# Patient Record
Sex: FEMALE | Race: WHITE | HISPANIC OR LATINO | Employment: UNEMPLOYED | URBAN - METROPOLITAN AREA
[De-identification: names, ages, dates, MRNs, and addresses within clinical notes are randomized per-mention and may not be internally consistent; named-entity substitution may affect disease eponyms.]

---

## 2022-04-22 ENCOUNTER — HISTORICAL (OUTPATIENT)
Dept: ADMINISTRATIVE | Facility: HOSPITAL | Age: 27
End: 2022-04-22
Payer: MEDICAID

## 2022-04-22 LAB
ABS NEUT (OLG): 6.75 (ref 2.1–9.2)
APPEARANCE, UA: NORMAL
BACTERIA SPEC CULT: NORMAL
BASOPHILS # BLD AUTO: 0 10*3/UL (ref 0–0.2)
BASOPHILS NFR BLD AUTO: 0 %
BILIRUB UR QL STRIP: NEGATIVE
BUN SERPL-MCNC: 8 MG/DL (ref 7–18.7)
CALCIUM SERPL-MCNC: 9.4 MG/DL (ref 8.7–10.5)
CHLORIDE SERPL-SCNC: 107 MMOL/L (ref 98–107)
CO2 SERPL-SCNC: 22 MMOL/L (ref 22–29)
COLOR UR: YELLOW
CREAT SERPL-MCNC: 0.81 MG/DL (ref 0.55–1.02)
CREAT/UREA NIT SERPL: 10
EOSINOPHIL # BLD AUTO: 0.2 10*3/UL (ref 0–0.9)
EOSINOPHIL NFR BLD AUTO: 2 %
ERYTHROCYTE [DISTWIDTH] IN BLOOD BY AUTOMATED COUNT: 13.2 % (ref 11.5–17)
GLUCOSE (UA): NEGATIVE
GLUCOSE SERPL-MCNC: 92 MG/DL (ref 74–100)
HCT VFR BLD AUTO: 38.7 % (ref 37–47)
HEMOLYSIS INTERF INDEX SERPL-ACNC: 33
HGB BLD-MCNC: 12.8 G/DL (ref 12–16)
HGB UR QL STRIP: NORMAL
ICTERIC INTERF INDEX SERPL-ACNC: 0
IMM GRANULOCYTES # BLD AUTO: 0.03 10*3/UL (ref 0–0.02)
IMM GRANULOCYTES NFR BLD AUTO: 0.3 % (ref 0–0.43)
KETONES UR QL STRIP: NEGATIVE
LEUKOCYTE ESTERASE UR QL STRIP: NEGATIVE
LIPEMIC INTERF INDEX SERPL-ACNC: 9
LYMPHOCYTES # BLD AUTO: 2.3 10*3/UL (ref 0.6–4.6)
LYMPHOCYTES NFR BLD AUTO: 23 %
MANUAL DIFF? (OHS): NO
MCH RBC QN AUTO: 31.8 PG (ref 27–31)
MCHC RBC AUTO-ENTMCNC: 33.1 G/DL (ref 33–36)
MCV RBC AUTO: 96 FL (ref 80–94)
MONOCYTES # BLD AUTO: 0.8 10*3/UL (ref 0.1–1.3)
MONOCYTES NFR BLD AUTO: 8 %
NEUTROPHILS # BLD AUTO: 6.75 10*3/UL (ref 1.4–7.9)
NEUTROPHILS NFR BLD AUTO: 67 %
NITRITE UR QL STRIP: NEGATIVE
PH UR STRIP: 7 [PH] (ref 5–9)
PLATELET # BLD AUTO: 236 10*3/UL (ref 130–400)
PMV BLD AUTO: 9.4 FL (ref 9.4–12.4)
POTASSIUM SERPL-SCNC: 4.1 MMOL/L (ref 3.5–5.1)
PROT UR QL STRIP: NEGATIVE
RBC # BLD AUTO: 4.03 10*6/UL (ref 4.2–5.4)
RBC #/AREA URNS HPF: NORMAL /[HPF] (ref 0–2)
SODIUM SERPL-SCNC: 142 MMOL/L (ref 136–145)
SP GR UR STRIP: 1.02 (ref 1–1.03)
SQUAMOUS EPITHELIAL, UA: NORMAL
UROBILINOGEN UR STRIP-ACNC: 0.2
WBC # SPEC AUTO: 10.1 10*3/UL (ref 4.5–11.5)
WBC #/AREA URNS HPF: NORMAL /[HPF] (ref 0–2)

## 2022-04-26 LAB
PAP RECOMMENDATION EXT: NORMAL
PAP SMEAR: NORMAL

## 2022-05-02 RX ORDER — PNV,CALCIUM 72/IRON/FOLIC ACID 27 MG-1 MG
1 TABLET ORAL DAILY
COMMUNITY
Start: 2022-05-02 | End: 2022-05-04 | Stop reason: SDUPTHER

## 2022-05-04 DIAGNOSIS — Z34.90 PREGNANCY, UNSPECIFIED GESTATIONAL AGE: Primary | ICD-10-CM

## 2022-05-05 ENCOUNTER — TELEPHONE (OUTPATIENT)
Dept: OBGYN | Facility: CLINIC | Age: 27
End: 2022-05-05
Payer: MEDICAID

## 2022-05-12 DIAGNOSIS — Z36.89 ENCOUNTER FOR FETAL ANATOMIC SURVEY: Primary | ICD-10-CM

## 2022-05-24 ENCOUNTER — OFFICE VISIT (OUTPATIENT)
Dept: FAMILY MEDICINE | Facility: CLINIC | Age: 27
End: 2022-05-24
Payer: MEDICAID

## 2022-05-24 ENCOUNTER — PROCEDURE VISIT (OUTPATIENT)
Dept: MATERNAL FETAL MEDICINE | Facility: CLINIC | Age: 27
End: 2022-05-24
Payer: MEDICAID

## 2022-05-24 VITALS
HEART RATE: 91 BPM | WEIGHT: 220.44 LBS | HEIGHT: 65 IN | DIASTOLIC BLOOD PRESSURE: 71 MMHG | TEMPERATURE: 98 F | BODY MASS INDEX: 36.73 KG/M2 | OXYGEN SATURATION: 99 % | SYSTOLIC BLOOD PRESSURE: 106 MMHG

## 2022-05-24 DIAGNOSIS — Z67.41 TYPE O BLOOD, RH NEGATIVE: ICD-10-CM

## 2022-05-24 DIAGNOSIS — Z3A.33 33 WEEKS GESTATION OF PREGNANCY: Primary | ICD-10-CM

## 2022-05-24 DIAGNOSIS — Z36.89 ENCOUNTER FOR FETAL ANATOMIC SURVEY: ICD-10-CM

## 2022-05-24 DIAGNOSIS — O99.810 ABNORMAL GLUCOSE TOLERANCE TEST (GTT) DURING PREGNANCY, ANTEPARTUM: ICD-10-CM

## 2022-05-24 LAB
BILIRUB SERPL-MCNC: NORMAL MG/DL
BLOOD URINE, POC: NORMAL
CLARITY, POC UA: CLEAR
COLOR, POC UA: YELLOW
GLUCOSE UR QL STRIP: NORMAL
KETONES UR QL STRIP: NORMAL
LEUKOCYTE ESTERASE URINE, POC: NORMAL
NITRITE, POC UA: NORMAL
PH, POC UA: 8.5
PROTEIN, POC: NORMAL
SPECIFIC GRAVITY, POC UA: 1.02
UROBILINOGEN, POC UA: NORMAL

## 2022-05-24 PROCEDURE — 76805 OB US >/= 14 WKS SNGL FETUS: CPT | Mod: S$GLB,,, | Performed by: OBSTETRICS & GYNECOLOGY

## 2022-05-24 PROCEDURE — 76805 US MFM PROCEDURE (VIEWPOINT): ICD-10-PCS | Mod: S$GLB,,, | Performed by: OBSTETRICS & GYNECOLOGY

## 2022-05-24 PROCEDURE — 99213 OFFICE O/P EST LOW 20 MIN: CPT | Mod: PBBFAC

## 2022-05-24 PROCEDURE — 81002 URINALYSIS NONAUTO W/O SCOPE: CPT | Mod: PBBFAC

## 2022-05-24 NOTE — PROGRESS NOTES
"  Subjective:       Patient ID: Stia Lovelace is a 27 y.o. female.    Chief Complaint:  Routine Prenatal Visit (32 Weeks 4 days OB visit, "cold, throat hurts, right ear pain x 1 week")      History of Present Illness  HPI   26 yo female  at 33.4 WGA by 29WGA ultrasound (EDC: 2022) presents for a routine prenatal visit.    Acute Complaints:  Complaining of cough, rhinorrhea, and ear pain, has felt feverish but has not taken temperature, associated R ear pain, has been feeling these symptoms for about one week, denies any sick contacts. Cough productive of clear-greenish sputum, pleuritic chest pain. Has not taken anything for her symptoms.    GYN & OB History  Gestational History:  G1: 2017, VMI via LTCS @ 38WGA Oligohydramnios  G2: current    Gyn History:   LMP: 10/9/2021  Age at menarche: 13yo  Menstrual hx:regular before 1st baby, irregular after birth control. monthly, 6 days, heaviest first day, no clots larger than a quarter  History of birth control: OCPs, Depo  History of STDs and/or Abnormal PAPs: Denies Hx STI. Ulcer on cervix? Dysplasia due to low defenses? s/p bx last pap 2021 in [place]      Review of Systems  OB Review of Systems:  Fetal movements: Yes  Vaginal bleeding: No  Vaginal discharge: No  Loss of fluid: Once about one week ago, has not happened since  Contractions: No  Headaches: Yes, resolved with Tylenol  Vision changes: Occasiobnal blurred vision in L eye, resolveds on own, not associated with headaches  Edema: just in feet, relived with elevation    Objective:   Vitals:  Blood pressure 106/71, pulse 91, temperature 98.4 °F (36.9 °C), height 5' 4.57" (1.64 m), weight 100 kg (220 lb 7.4 oz), SpO2 99 %.    Physical Exam    General: NAD  RESP: clear to auscultation bilaterally, non labored  CV: regular rate and rhythm, no murmurs, no edema  ABD: gravid, nontender, Bs+    FHTs: 146-150 bpm via doppler    Fundal height: 33.5cm    POCT URINE DIPSTICK WITHOUT MICROSCOPE  Order: " [id number]   Status: Final result     Visible to patient: No (inaccessible in Patient Portal)     Next appt: 06/07/2022 at 03:00 PM in Family Medicine (RESIDENT [address] [facility])     Dx: 33 weeks gestation of pregnancy     0 Result Notes    Component 13:38    Color, UA Yellow    pH, UA 8.5    WBC, UA neg    Nitrite, UA neg    Protein, POC neg    Glucose, UA neg    Ketones, UA neg    Urobilinogen, UA 0.2 E.U. / dL    Bilirubin, POC neg    Blood, UA Trace- Intact    Clarity, UA Clear    Spec Grav UA 1.020               Specimen Collected: 05/24/22 13:38 Last Resulted: 05/24/22 13:38               Initial OB Labs: 04/22/2022  - Blood Type and Rh: O Negative  - Antibody Screen: Negative  - CBC H/H: 12.7/37.5  - HIV: Nonreactive  - RPR: Nonreactive  - GC: Negative  - CT: Negative  - HBsAg: Nonreactive  - HCVAb: Nonreactive  - Rubella: Immune  - Varicella: Immune  - UA & Culture: Negative  - Sickle Cell Screen: Negative  - PAP: NIL    15-20 Weeks Lab  - Quad Screen: Presented for care out of range for screening    28 Week Lab: 04/22/2022  - 1H GTT: 147  - Rhogam: WILL NEED TO BE ADMINISTERED AT NEXT VISIT  - Date of Tdap: Not Done    36 Week Lab  - CBC H/H: _  - RPR: _  - GBS Culture: _  - HIV: _  - Urine: GC: _    Ultrasound  -Initial US: 04/22/2022  There is a single intrauterine gestation breech presentation. There is subjective normal amniotic fluid volume. Placenta anterior unremarkable. Positive fetal heart tones documented rate 159 BPM. SHELBY 18. Fetal measurements are concordant, estimated fetal weight 1311 g     IMPRESSION:  Single viable intrauterine gestation estimated age 29 weeks breech presentation    -20 wk anatomy US: 05/12/2022  Results Pending    Assessment:        1. 33 weeks gestation of pregnancy    2. Type O blood, Rh negative    3. Abnormal glucose tolerance test (GTT) during pregnancy, antepartum             Plan:      Type O blood, Rh negative  -Will need Rhogam at next visit.    33 weeks  gestation of pregnancy  -OB Protocol  -Counselled to take PNVs daily, stay well hydrated  -Tylenol only if needed for headaches  -Urine dip reviewed  -Initial labs and 1hr GTT reviewed, failed 1hr GTT, will schedule for fasting 3hr GTT next week  -Postpartum contraception: Patient desires BTL,  Forms signed 05/24/2022, scanned into chart  -Labor and ED precautions discussed in depth.      Abnormal glucose tolerance test (GTT) during pregnancy, antepartum  -3hr Fasting GTT previously ordered, unable to contact patient for results of 1hr GTT and to schedule 3hr fasting GTT  -Patient not fasting today, will like to come next week for testing.  -Nurse visit in 1 wk for 3hr GTT      RTC in 2 wks for routine prenatal follow up

## 2022-05-25 NOTE — ASSESSMENT & PLAN NOTE
-OB Protocol  -Counselled to take PNVs daily, stay well hydrated  -Tylenol only if needed for headaches  -Urine dip reviewed  -Initial labs and 1hr GTT reviewed, failed 1hr GTT, will schedule for fasting 3hr GTT next week  -Postpartum contraception: Patient desires BTL,  Forms signed 05/24/2022, scanned into chart  -Labor and ED precautions discussed in depth.

## 2022-05-25 NOTE — ASSESSMENT & PLAN NOTE
-3hr Fasting GTT previously ordered, unable to contact patient for results of 1hr GTT and to schedule 3hr fasting GTT  -Patient not fasting today, will like to come next week for testing.  -Nurse visit in 1 wk for 3hr GTT

## 2022-05-29 DIAGNOSIS — Z3A.34 34 WEEKS GESTATION OF PREGNANCY: Primary | ICD-10-CM

## 2022-05-29 DIAGNOSIS — O26.893 RH NEGATIVE STATE IN ANTEPARTUM PERIOD, THIRD TRIMESTER: ICD-10-CM

## 2022-05-29 DIAGNOSIS — Z67.91 RH NEGATIVE STATE IN ANTEPARTUM PERIOD, THIRD TRIMESTER: ICD-10-CM

## 2022-05-29 NOTE — PROGRESS NOTES
I have reviewed the notes, assessments, and/or procedures performed this visit, and I concur with the documentation.    Will clarify with resident regarding ENT exam, need for antibiotics, due for Rhogam, order for GTT reviewed and valid for clinic collect  Need monitor for presentation as nears term  BTL forms reviewed, appear correct, located under media    Nicolle LARON Schultz MD  [facility], Family Medicine Residency program

## 2022-05-29 NOTE — PROGRESS NOTES
Patient had visit on 05/24/2022, at that visit she complained of URI like symptoms x1 week. On PE she did not have an frontal or maxillary sinus tenderness, TMs were visible, with no bulging, no fluid behind TM, external ear canals were non inflamed bilaterally.    Patient had failed 1hr GTT and needed to have 3hr GTT and Rhogam injection at time of visit, however she said she did not want to stay to do either and would return in 1wk for a nurse visit to have both 3hr GTT and Rhogam injection done.  Explained to patient importance of returning for both procedures (with help of ) patient voiced understanding.

## 2022-06-07 DIAGNOSIS — Z36.89 ENCOUNTER FOR FETAL ANATOMIC SURVEY: Primary | ICD-10-CM

## 2022-06-09 ENCOUNTER — OFFICE VISIT (OUTPATIENT)
Dept: FAMILY MEDICINE | Facility: CLINIC | Age: 27
End: 2022-06-09
Payer: MEDICAID

## 2022-06-09 VITALS
HEART RATE: 83 BPM | OXYGEN SATURATION: 100 % | BODY MASS INDEX: 38.47 KG/M2 | WEIGHT: 225.31 LBS | DIASTOLIC BLOOD PRESSURE: 69 MMHG | SYSTOLIC BLOOD PRESSURE: 110 MMHG | HEIGHT: 64 IN | TEMPERATURE: 98 F

## 2022-06-09 DIAGNOSIS — O99.810 ABNORMAL GLUCOSE TOLERANCE TEST (GTT) DURING PREGNANCY, ANTEPARTUM: ICD-10-CM

## 2022-06-09 DIAGNOSIS — Z3A.35 35 WEEKS GESTATION OF PREGNANCY: Primary | ICD-10-CM

## 2022-06-09 DIAGNOSIS — Z67.41 TYPE O BLOOD, RH NEGATIVE: ICD-10-CM

## 2022-06-09 LAB
BILIRUB SERPL-MCNC: NEGATIVE MG/DL
BLOOD URINE, POC: NORMAL
CLARITY, POC UA: NORMAL
COLOR, POC UA: YELLOW
GLUCOSE UR QL STRIP: NEGATIVE
KETONES UR QL STRIP: NEGATIVE
LEUKOCYTE ESTERASE URINE, POC: NEGATIVE
NITRITE, POC UA: NEGATIVE
PH, POC UA: 7
PROTEIN, POC: NEGATIVE
SPECIFIC GRAVITY, POC UA: 1.01
UROBILINOGEN, POC UA: 0.2

## 2022-06-09 PROCEDURE — 99213 OFFICE O/P EST LOW 20 MIN: CPT | Mod: PBBFAC

## 2022-06-09 PROCEDURE — 81002 URINALYSIS NONAUTO W/O SCOPE: CPT | Mod: PBBFAC

## 2022-06-09 NOTE — PROGRESS NOTES
[facility] FM Office Visit Note  OB Antepartum    Chief Complaint:   Routine Prenatal Visit (35^6)     HPI:  Sita Lovelace is a 27 y.o. female   35w6d 2022, by Ultrasound [facility].    Current Issues:   None. Compliant with PNVs. Doing well.     Chronic Issues:   1. Rh negative status - missed Rhogam inj at last visit.   2. Abnormal GTT - failed 1 hour GTT, was unable to perform 3 hour at last visit.      Gestational History:  (date, GA, length labor, BW, sex, type, anes, place, complications)  G1: 2017, VMI via LTCS @ 38WGA Oligohydramnios  G2: current    Gyn History:   LMP: 10/9/2021  Age at menarche: 13yo  Menstrual hx:regular before 1st baby, irregular after birth control. monthly, 6 days, heaviest first day, no clots larger than a quarter  History of birth control: OCPs, Depo  History of STDs and/or Abnormal PAPs: Denies Hx STI. Ulcer on cervix? Dysplasia due to low defenses? s/p bx last pap 2021 in [place]    Review of Systems:   Fetal movement: yes   Vaginal Bleeding: no   Vaginal discharge: no   Fluid Leakage: no   Headaches: no   Vision Changes: no   Edema: no   Contractions: no    Constitutional: -fever, -chills  Respiratory: -cough, -shortness of breath  Cardiovascular: -chest pain, -palpitations  Gastrointestinal: -nausea, -vomiting, -constipation, -diarrhea  Integumentary: -rash, -lesion    Physical Exam:   Vitals:    22 1519   BP: 110/69   Pulse: 83   Temp: 98.4 °F (36.9 °C)        General: NAD, resting comfortably  Heart: RRR  Lungs: CTAB, nonlabored respirations  Abdomen: Gravid, nontender    Fundal Height: 36 cm    FHTs: 145 bpm  Extremities: Nonedematous, nontender      Current Medications:   Current Outpatient Medications   Medication Sig Dispense Refill    PNV,calcium 72-iron-folic acid (WESTAB PLUS) 27 mg iron- 1 mg Tab Take 1 tablet (1 each total) by mouth once daily. 30 tablet 2     No current facility-administered medications for this visit.        Labs:  Urine dipstick:    06/09/22 16:47   Clarity, UA Slightly Cloudy   Color, UA Yellow   pH, UA 7.0   Spec Grav UA 1.015   Blood, UA moderate   WBC, UA negative   Nitrite, UA negative   Glucose, UA negative   Bilirubin, POC negative   Protein, POC negative   Ketones, UA negative   Urobilinogen, UA 0.2       Initial OB Labs:   - Blood Type and Rh: O Negative  - Antibody Screen: Negative  - CBC H/H: 12.7/37.5  - HIV: Nonreactive  - RPR: Nonreactive  - GC: Negative  - CT: Negative  - HBsAg: Nonreactive  - HCVAb: Nonreactive  - Rubella: Immune  - Varicella: Immune  - UA & Culture: Negative  - Sickle Cell Screen: Negative  - PAP: NIL    15-20 Weeks Lab  - Quad Screen: Presented for care out of range for screening    28 Week Lab: 04/22/2022  - 1H GTT: 147  - 3 H GTT: *planned for morning of 6/10  - Rhogam: *planned for morning of 6/10  - Date of Tdap: Not Done    37 Week Lab  - CBC H/H: _  - RPR: _  - GBS Culture: _  - HIV: _  - Urine: GC: _    Imaging:   Initial US 4/22/22:  single intrauterine gestation breech presentation. There is subjective normal amniotic fluid volume. Placenta anterior unremarkable. Positive fetal heart tones documented rate 159 BPM. SHELBY 18. Fetal measurements are concordant, estimated fetal weight 1311 g  Anatomy Scan 5/24/22: No fetal structural malformations are identified within the limitations of a third trimester exam (see limitations as noted above in   the fetal anatomy section of this report). AFV is normal. There is no evidence of placenta previa    Assessment:   1. 35 weeks gestation of pregnancy    2. Abnormal glucose tolerance test (GTT) during pregnancy, antepartum    3. Type O blood, Rh negative         Plan:  - Planned repeat CS. Need to schedule for 39 WGA at future visit.  - PNVs  - Urine dip reviewed as above  - Routine (initial) labs: reviewed.   - Mother plans to breast and bottlefeed  - Postpartum contraception Patient desires BTL,  Forms signed 05/24/2022, scanned  into chart  - Labor precautions discussed in depth  - Unable to administer Rhogam today in clinic due to lack of nursing staff eligible to administer shot. Unable to perform 3H GTT due to patient time constraints. Will perform both tomorrow morning. Patient plans to return to clinic around 10 AM.     Return to clinic in 1 week for routine visit  Return tomorrow for Rhogam and 3 H GTT    Darshan Hicks M.D. [facility] Medicine

## 2022-06-10 ENCOUNTER — CLINICAL SUPPORT (OUTPATIENT)
Dept: FAMILY MEDICINE | Facility: CLINIC | Age: 27
End: 2022-06-10
Payer: MEDICAID

## 2022-06-10 DIAGNOSIS — O99.810 ABNORMAL GLUCOSE TOLERANCE TEST (GTT) DURING PREGNANCY, ANTEPARTUM: Primary | ICD-10-CM

## 2022-06-10 DIAGNOSIS — Z67.41 TYPE O BLOOD, RH NEGATIVE: ICD-10-CM

## 2022-06-10 DIAGNOSIS — Z67.91 RH NEGATIVE STATE IN ANTEPARTUM PERIOD: ICD-10-CM

## 2022-06-10 DIAGNOSIS — O26.899 RH NEGATIVE STATE IN ANTEPARTUM PERIOD: Primary | ICD-10-CM

## 2022-06-10 DIAGNOSIS — O26.899 RH NEGATIVE STATE IN ANTEPARTUM PERIOD: ICD-10-CM

## 2022-06-10 DIAGNOSIS — Z67.91 RH NEGATIVE STATE IN ANTEPARTUM PERIOD: Primary | ICD-10-CM

## 2022-06-10 DIAGNOSIS — Z29.13 NEED FOR RHOGAM DUE TO RH NEGATIVE MOTHER: ICD-10-CM

## 2022-06-10 DIAGNOSIS — O99.810 ABNORMAL GLUCOSE TOLERANCE TEST (GTT) DURING PREGNANCY, ANTEPARTUM: ICD-10-CM

## 2022-06-10 DIAGNOSIS — Z29.13 NEED FOR RHOGAM DUE TO RH NEGATIVE MOTHER: Primary | ICD-10-CM

## 2022-06-10 LAB
GLUCOSE 1H P 100 G GLC PO SERPL-MCNC: 183 MG/DL (ref 74–100)
GLUCOSE 2H P 100 G GLC PO SERPL-MCNC: 154 MG/DL (ref 74–100)
GLUCOSE 3H P 100 G GLC PO SERPL-MCNC: 119 MG/DL (ref 74–100)
GLUCOSE P FAST SERPL-MCNC: 91 MG/DL (ref 74–100)
GROUP & RH: NORMAL
INDIRECT COOMBS GEL: NORMAL
RH IMMUNE GLOBULIN: NORMAL

## 2022-06-10 PROCEDURE — 82951 GLUCOSE TOLERANCE TEST (GTT): CPT

## 2022-06-10 PROCEDURE — 82947 ASSAY GLUCOSE BLOOD QUANT: CPT

## 2022-06-10 PROCEDURE — 82950 GLUCOSE TEST: CPT

## 2022-06-10 PROCEDURE — 99211 OFF/OP EST MAY X REQ PHY/QHP: CPT | Mod: PBBFAC

## 2022-06-10 PROCEDURE — 36415 COLL VENOUS BLD VENIPUNCTURE: CPT

## 2022-06-10 PROCEDURE — 86850 RBC ANTIBODY SCREEN: CPT

## 2022-06-10 NOTE — PROGRESS NOTES
I reviewed History, PE, A/P and chart was reviewed.  Services provided in a teaching facility, I was immediately available  I agree with resident, care reasonable and appropriate.   Management discussed with resident at time of visit.  Shows pt has arrived today for shot and labs

## 2022-06-14 ENCOUNTER — TELEPHONE (OUTPATIENT)
Dept: FAMILY MEDICINE | Facility: CLINIC | Age: 27
End: 2022-06-14
Payer: MEDICAID

## 2022-06-14 ENCOUNTER — PROCEDURE VISIT (OUTPATIENT)
Dept: MATERNAL FETAL MEDICINE | Facility: CLINIC | Age: 27
End: 2022-06-14
Payer: MEDICAID

## 2022-06-14 DIAGNOSIS — Z36.89 ENCOUNTER FOR FETAL ANATOMIC SURVEY: ICD-10-CM

## 2022-06-14 PROCEDURE — 76816 US MFM PROCEDURE (VIEWPOINT): ICD-10-PCS | Mod: S$GLB,,, | Performed by: OBSTETRICS & GYNECOLOGY

## 2022-06-14 PROCEDURE — 76816 OB US FOLLOW-UP PER FETUS: CPT | Mod: S$GLB,,, | Performed by: OBSTETRICS & GYNECOLOGY

## 2022-06-14 NOTE — TELEPHONE ENCOUNTER
It looks like Dr. Garcia saw this lady.  I was asked to enter the labs by Mr. Beebe because they were not in the computer.

## 2022-06-14 NOTE — TELEPHONE ENCOUNTER
Please review it appears as though rhogam was not given, please ask nurse or Dr JOSESITO Hess - looks like he tried to put the order in, she is now 36 weeks   mailed

## 2022-06-16 ENCOUNTER — OFFICE VISIT (OUTPATIENT)
Dept: FAMILY MEDICINE | Facility: CLINIC | Age: 27
End: 2022-06-16
Payer: MEDICAID

## 2022-06-16 VITALS
WEIGHT: 226.63 LBS | SYSTOLIC BLOOD PRESSURE: 116 MMHG | OXYGEN SATURATION: 99 % | HEIGHT: 64 IN | BODY MASS INDEX: 38.69 KG/M2 | DIASTOLIC BLOOD PRESSURE: 69 MMHG | HEART RATE: 74 BPM | TEMPERATURE: 99 F

## 2022-06-16 DIAGNOSIS — Z98.891 H/O CESAREAN SECTION: ICD-10-CM

## 2022-06-16 DIAGNOSIS — Z34.90 PREGNANCY, UNSPECIFIED GESTATIONAL AGE: Primary | ICD-10-CM

## 2022-06-16 DIAGNOSIS — Z67.91 RH NEGATIVE, ANTEPARTUM: Primary | ICD-10-CM

## 2022-06-16 DIAGNOSIS — O24.410 DIET CONTROLLED GESTATIONAL DIABETES MELLITUS (GDM) IN THIRD TRIMESTER: ICD-10-CM

## 2022-06-16 DIAGNOSIS — O26.899 RH NEGATIVE, ANTEPARTUM: Primary | ICD-10-CM

## 2022-06-16 DIAGNOSIS — O26.899 RH NEGATIVE, ANTEPARTUM: ICD-10-CM

## 2022-06-16 DIAGNOSIS — Z67.91 RH NEGATIVE, ANTEPARTUM: ICD-10-CM

## 2022-06-16 LAB
BASOPHILS # BLD AUTO: 0.03 X10(3)/MCL (ref 0–0.2)
BASOPHILS NFR BLD AUTO: 0.4 %
BILIRUB SERPL-MCNC: NEGATIVE MG/DL
BILIRUB SERPL-MCNC: NORMAL MG/DL
BLOOD URINE, POC: NORMAL
BLOOD URINE, POC: NORMAL
C TRACH DNA SPEC QL NAA+PROBE: NOT DETECTED
COLOR, POC UA: YELLOW
COLOR, POC UA: YELLOW
EOSINOPHIL # BLD AUTO: 0.18 X10(3)/MCL (ref 0–0.9)
EOSINOPHIL NFR BLD AUTO: 2.1 %
ERYTHROCYTE [DISTWIDTH] IN BLOOD BY AUTOMATED COUNT: 13.1 % (ref 11.5–17)
GLUCOSE UR QL STRIP: NEGATIVE
GLUCOSE UR QL STRIP: NORMAL
GROUP & RH: NORMAL
HCT VFR BLD AUTO: 38.8 % (ref 37–47)
HGB BLD-MCNC: 13.1 GM/DL (ref 12–16)
IMM GRANULOCYTES # BLD AUTO: 0.03 X10(3)/MCL (ref 0–0.02)
IMM GRANULOCYTES NFR BLD AUTO: 0.4 % (ref 0–0.43)
INDIRECT COOMBS GEL: NORMAL
KETONES UR QL STRIP: NEGATIVE
KETONES UR QL STRIP: NORMAL
LEUKOCYTE ESTERASE URINE, POC: NEGATIVE
LEUKOCYTE ESTERASE URINE, POC: NORMAL
LYMPHOCYTES # BLD AUTO: 1.99 X10(3)/MCL (ref 0.6–4.6)
LYMPHOCYTES NFR BLD AUTO: 23.5 %
MCH RBC QN AUTO: 32.1 PG (ref 27–31)
MCHC RBC AUTO-ENTMCNC: 33.8 MG/DL (ref 33–36)
MCV RBC AUTO: 95.1 FL (ref 80–94)
MONOCYTES # BLD AUTO: 0.69 X10(3)/MCL (ref 0.1–1.3)
MONOCYTES NFR BLD AUTO: 8.1 %
N GONORRHOEA DNA SPEC QL NAA+PROBE: NOT DETECTED
NEUTROPHILS # BLD AUTO: 5.6 X10(3)/MCL (ref 2.1–9.2)
NEUTROPHILS NFR BLD AUTO: 65.5 %
NITRITE, POC UA: NEGATIVE
NITRITE, POC UA: NORMAL
NRBC BLD AUTO-RTO: 0 %
PH, POC UA: 6.5
PH, POC UA: 7
PLATELET # BLD AUTO: 195 X10(3)/MCL (ref 130–400)
PMV BLD AUTO: 10.1 FL (ref 9.4–12.4)
PROTEIN, POC: NEGATIVE
PROTEIN, POC: NORMAL
RBC # BLD AUTO: 4.08 X10(6)/MCL (ref 4.2–5.4)
RH IMMUNE GLOBULIN: NORMAL
SPECIFIC GRAVITY, POC UA: 1.02
SPECIFIC GRAVITY, POC UA: 1.03
T PALLIDUM AB SER QL: NONREACTIVE
T PALLIDUM AB SER QL: NORMAL
UROBILINOGEN, POC UA: 0.2
UROBILINOGEN, POC UA: 0.2
WBC # SPEC AUTO: 8.5 X10(3)/MCL (ref 4.5–11.5)

## 2022-06-16 PROCEDURE — 87081 CULTURE SCREEN ONLY: CPT

## 2022-06-16 PROCEDURE — 86780 TREPONEMA PALLIDUM: CPT

## 2022-06-16 PROCEDURE — 86901 BLOOD TYPING SEROLOGIC RH(D): CPT | Performed by: STUDENT IN AN ORGANIZED HEALTH CARE EDUCATION/TRAINING PROGRAM

## 2022-06-16 PROCEDURE — 36415 COLL VENOUS BLD VENIPUNCTURE: CPT

## 2022-06-16 PROCEDURE — 87389 HIV-1 AG W/HIV-1&-2 AB AG IA: CPT

## 2022-06-16 PROCEDURE — 81001 URINALYSIS AUTO W/SCOPE: CPT | Mod: PBBFAC

## 2022-06-16 PROCEDURE — 87491 CHLMYD TRACH DNA AMP PROBE: CPT

## 2022-06-16 PROCEDURE — 85025 COMPLETE CBC W/AUTO DIFF WBC: CPT

## 2022-06-16 PROCEDURE — 85461 HEMOGLOBIN FETAL: CPT | Performed by: STUDENT IN AN ORGANIZED HEALTH CARE EDUCATION/TRAINING PROGRAM

## 2022-06-16 PROCEDURE — 87591 N.GONORRHOEAE DNA AMP PROB: CPT

## 2022-06-16 PROCEDURE — 99213 OFFICE O/P EST LOW 20 MIN: CPT | Mod: PBBFAC

## 2022-06-16 NOTE — LETTER
Diagnosis: Gestational Diabetes  O24,419    Diabetic Testing Strips: 4 boxes, 100 strips; 5 refills  Lancets: 100 lancets; 3 refills  Lancing Device: 1 unit  Glucometer: 1 unit      Agusto Sena DO

## 2022-06-17 PROBLEM — O41.00X0 OLIGOHYDRAMNIOS: Status: ACTIVE | Noted: 2022-04-26

## 2022-06-17 PROBLEM — O24.410 DIET CONTROLLED GESTATIONAL DIABETES MELLITUS (GDM) IN THIRD TRIMESTER: Status: ACTIVE | Noted: 2022-06-17

## 2022-06-17 PROBLEM — Z98.891 H/O CESAREAN SECTION: Status: ACTIVE | Noted: 2022-06-17

## 2022-06-17 PROBLEM — Z3A.33 33 WEEKS GESTATION OF PREGNANCY: Status: RESOLVED | Noted: 2022-05-24 | Resolved: 2022-06-17

## 2022-06-17 PROBLEM — O99.810 ABNORMAL GLUCOSE TOLERANCE TEST (GTT) DURING PREGNANCY, ANTEPARTUM: Status: RESOLVED | Noted: 2022-05-24 | Resolved: 2022-06-17

## 2022-06-17 LAB — HIV 1+2 AB+HIV1 P24 AG SERPL QL IA: NONREACTIVE

## 2022-06-17 RX ORDER — MULTIVITAMIN
1 TABLET ORAL DAILY
COMMUNITY
End: 2022-06-17

## 2022-06-17 NOTE — PROGRESS NOTES
Subjective:       Patient ID: Sita Lovelace is a 27 y.o. female.    Chief Complaint: Routine Prenatal Visit (36 w 6 d OB)      HPI:    Sita Lovelace is a 27 y.o. female   36w6d 2022, by Ultrasound presenting to [facility] for routine visit.    Current Issues:   None. Compliant with PNVs. Doing well.     Chronic Issues:   1. Rh negative status - missed Rhogam inj at last visit.   2. +Gestational Diabetes with + 3 hour GTT     Gestational History:  (date, GA, length labor, BW, sex, type, anes, place, complications)  G1: 2017, VMI via LTCS @ 38WGA Oligohydramnios  G2: current    Gyn History:   LMP: 10/9/2021  Age at menarche: 11yo  Menstrual hx:regular before 1st baby, irregular after birth control. monthly, 6 days, heaviest first day, no clots larger than a quarter  History of birth control: OCPs, Depo  History of STDs and/or Abnormal PAPs: Denies Hx STI. Ulcer on cervix? Dysplasia due to low defenses? s/p bx last pap 2021 in [place]     Review of Systems:   Fetal movement: yes   Vaginal Bleeding: no   Vaginal discharge: no   Fluid Leakage: no   Headaches: no   Vision Changes: no   Edema:  swelling in feet for past month   Contractions: no  : no dysuria      PE:  Vitals:    22 1427   BP: 116/69   Pulse: 74   Temp: 98.8 °F (37.1 °C)     General: NAD, resting comfortably  Heart: RRR  Lungs: CTAB, nonlabored respirations  Abdomen: Gravid, nontender    Fundal Height: 37 cm    FHTs: 140s bpm  Extremities: 1+ swelling in b/l feet, no pain to palpation of calves b/l, no erythema noted   : no labial lesions, mild white discharge in vaginal vault, cervix closed with no lesions on os.     22 15:22   Color, UA Yellow   pH, UA 6.5   Spec Grav UA 1.030   Blood, UA SMALL   WBC, UA NEGATIVE   Nitrite, UA NEGATIVE   Glucose, UA NEGATIVE   Bilirubin, POC NEGATIVE   Protein, POC NEGATIVE   Ketones, UA NEGATIVE   Urobilinogen, UA 0.2       Initial OB Labs:   - Blood Type and Rh: O Negative  - Antibody  Screen: Negative  - CBC H/H: 12.7/37.5  - HIV: Nonreactive  - RPR: Nonreactive  - GC: Negative  - CT: Negative  - HBsAg: Nonreactive  - HCVAb: Nonreactive  - Rubella: Immune  - Varicella: Immune  - UA & Culture: Negative  - Sickle Cell Screen: Negative  - PAP: NIL    15-20 Weeks Lab  - Quad Screen: Presented for care out of range for screening    28 Week Lab: 04/22/2022  - 1H GTT: 147  -3 hour GTT: + for Gestational Diabetes  - Date of Tdap: Not Done    37 Week Lab  -Rhogam: received today 6/16/22  - CBC H/H: _  - RPR: _  - GBS Culture: _  - HIV: _  - Urine: GC: _      Imaging:   Initial US 4/22/22:  single intrauterine gestation breech presentation. There is subjective normal amniotic fluid volume. Placenta anterior unremarkable. Positive fetal heart tones documented rate 159 BPM. SHELBY 18. Fetal measurements are concordant, estimated fetal weight 1311 g  Anatomy Scan 5/24/22: No fetal structural malformations are identified within the limitations of a third trimester exam (see limitations as noted above in   the fetal anatomy section of this report). AFV is normal. There is no evidence of placenta previa    Assessment and Plan:    36 WGA  - Planned repeat CS. Will call and schedule tomorrow  - PNVs  - Urine dip reviewed as above  - Routine (initial) labs: reviewed.   -37 week labs done including GBS  - Mother plans to breast and bottlefeed  - Postpartum contraception Patient desires BTL,  Forms signed 05/24/2022, scanned into chart  - Labor precautions discussed in depth    Type O Blood, RH (-)  -Rhogam given today 6/16/22      Gestational Diabetes  -+ 3 hour GTT/failed3 hour GTT  -sugar log given, handouts given in [place]  -glucometer, strips, lancets faxed to pharmacy  -refer back to high risk OB; will discuss with nurse  Marianela    Breech Presentation  -by US 4/22/22  -plan for C -section on 6/28/22 at 9 AM; we personally need to tell them to arrive at 6: 45 AM on day of 6/28/22.      -follow up in 1 week

## 2022-06-18 LAB — BACTERIA SPEC CULT: NORMAL

## 2022-06-18 NOTE — PROGRESS NOTES
I reviewed History, PE, A/P and chart was reviewed.  Services provided in a teaching facility, I was immediately available  I agree with resident, care reasonable and appropriate.   Management discussed with resident at time of visit.    Diagnosis breech 1 of christina dutta is incorrect    Rhogam not given at prev visit as there was no RN here to admin  Was given this visit, BTL papers on chart , c /s scheduled, was breech presentation 4/2022  Pt has [facility] FU, will need to ensure she was able to get testing supplies and instruction on use  Tdap has not been given that I can see although LINKS incomplete, it does show  - review at FU  One from 2017

## 2022-06-23 ENCOUNTER — OFFICE VISIT (OUTPATIENT)
Dept: FAMILY MEDICINE | Facility: CLINIC | Age: 27
End: 2022-06-23
Payer: MEDICAID

## 2022-06-23 VITALS
HEIGHT: 64 IN | WEIGHT: 226.63 LBS | RESPIRATION RATE: 20 BRPM | SYSTOLIC BLOOD PRESSURE: 108 MMHG | HEART RATE: 68 BPM | BODY MASS INDEX: 38.69 KG/M2 | DIASTOLIC BLOOD PRESSURE: 73 MMHG | OXYGEN SATURATION: 100 % | TEMPERATURE: 98 F

## 2022-06-23 DIAGNOSIS — Z30.2 REQUEST FOR STERILIZATION: ICD-10-CM

## 2022-06-23 DIAGNOSIS — Z67.91 RH NEGATIVE, ANTEPARTUM: ICD-10-CM

## 2022-06-23 DIAGNOSIS — O26.899 RH NEGATIVE, ANTEPARTUM: ICD-10-CM

## 2022-06-23 DIAGNOSIS — O24.419 GESTATIONAL DIABETES MELLITUS (GDM) IN THIRD TRIMESTER, GESTATIONAL DIABETES METHOD OF CONTROL UNSPECIFIED: ICD-10-CM

## 2022-06-23 DIAGNOSIS — Z3A.37 37 WEEKS GESTATION OF PREGNANCY: Primary | ICD-10-CM

## 2022-06-23 DIAGNOSIS — Z98.891 H/O CESAREAN SECTION: ICD-10-CM

## 2022-06-23 LAB
APPEARANCE UR: ABNORMAL
BACTERIA #/AREA URNS AUTO: ABNORMAL /HPF
BILIRUB SERPL-MCNC: NEGATIVE MG/DL
BILIRUB UR QL STRIP.AUTO: NEGATIVE MG/DL
BLOOD URINE, POC: NORMAL
CLARITY, POC UA: NORMAL
COLOR UR AUTO: YELLOW
COLOR, POC UA: YELLOW
GLUCOSE UR QL STRIP.AUTO: NORMAL MG/DL
GLUCOSE UR QL STRIP: NEGATIVE
HYALINE CASTS #/AREA URNS LPF: ABNORMAL /LPF
KETONES UR QL STRIP.AUTO: NEGATIVE MG/DL
KETONES UR QL STRIP: NEGATIVE
LEUKOCYTE ESTERASE UR QL STRIP.AUTO: NEGATIVE UNIT/L
LEUKOCYTE ESTERASE URINE, POC: NEGATIVE
MUCOUS THREADS URNS QL MICRO: ABNORMAL /LPF
NITRITE UR QL STRIP.AUTO: NEGATIVE
NITRITE, POC UA: NEGATIVE
PH UR STRIP.AUTO: 6 [PH]
PH, POC UA: 6
PROT UR QL STRIP.AUTO: ABNORMAL MG/DL
PROTEIN, POC: NEGATIVE
RBC #/AREA URNS AUTO: ABNORMAL /HPF
RBC UR QL AUTO: ABNORMAL UNIT/L
SP GR UR STRIP.AUTO: 1.02
SPECIFIC GRAVITY, POC UA: 1.02
SQUAMOUS #/AREA URNS LPF: ABNORMAL /HPF
UROBILINOGEN UR STRIP-ACNC: NORMAL MG/DL
UROBILINOGEN, POC UA: NORMAL
WBC #/AREA URNS AUTO: ABNORMAL /HPF

## 2022-06-23 PROCEDURE — 99214 OFFICE O/P EST MOD 30 MIN: CPT | Mod: PBBFAC

## 2022-06-23 PROCEDURE — 81001 URINALYSIS AUTO W/SCOPE: CPT

## 2022-06-23 PROCEDURE — 90715 TDAP VACCINE 7 YRS/> IM: CPT | Mod: PBBFAC

## 2022-06-23 PROCEDURE — 81002 URINALYSIS NONAUTO W/O SCOPE: CPT | Mod: PBBFAC

## 2022-06-23 PROCEDURE — 90471 IMMUNIZATION ADMIN: CPT | Mod: PBBFAC

## 2022-06-23 RX ORDER — METFORMIN HYDROCHLORIDE 500 MG/1
500 TABLET, EXTENDED RELEASE ORAL NIGHTLY
Qty: 90 TABLET | Refills: 1 | Status: ON HOLD | OUTPATIENT
Start: 2022-06-23 | End: 2022-07-08 | Stop reason: HOSPADM

## 2022-06-23 RX ADMIN — TETANUS TOXOID, REDUCED DIPHTHERIA TOXOID AND ACELLULAR PERTUSSIS VACCINE, ADSORBED 0.5 ML: 5; 2.5; 8; 8; 2.5 SUSPENSION INTRAMUSCULAR at 09:06

## 2022-06-23 NOTE — PROGRESS NOTES
"[facility] OB OFFICE VISIT NOTE  Sita Lovelace  [id number]  2022      Chief Complaint: Routine Prenatal Visit (37wk, 6d)      Sita Lovelace is a 27 y.o.  female 37w6d by 3rd trimester US (CHERRI Estimated Date of Delivery: 22) presenting to [facility] for prenatal care.     Current Issues: No complaints or concerns. Compliant with PNVs.     Chronic Issues:   1. RH negative status- RhoGAM received 22    Gestational History:  G1: 2017, VMI via LTCS @ 38WGA Oligohydramnios  G2: current    Gyn History:   - LMP: ?10/9/2021  - Age at menarche: 13yo  - Menstrual hx:regular before 1st baby, irregular after birth control. monthly, 6 days, heaviest first day, no clots larger than a quarter  - History of birth control: OCPs, Depo  History of STDs and/or Abnormal PAPs: Denies Hx STI. Ulcer on cervix? Dysplasia due to low defenses? s/p bx last pap 2021 in [place]     Past Medical History: denies  Surgical History:   Family History: denies  Social History: denies alcohol, tobacco and illicit drug use  Medications: PNV    Review of Systems  Constitutional: no fever, no chills  CV: no chest pain  RESP: no SOB  : no dysuria, no hematuria  GI: no constipation, no diarrhea, no nausea, no vomiting  Psych: no depression, no anxiety    Antepartum specific   - Fetal movements: yes  - Vaginal bleeding: no  - Vaginal discharge: no  - Loss of fluid: no  - Contractions: no  - Headaches: no  - Vision changes: no  - Edema: a little     Blood pressure 108/73, pulse 68, temperature 98.2 °F (36.8 °C), temperature source Oral, resp. rate 20, height 5' 4" (1.626 m), weight 102.8 kg (226 lb 9.6 oz), SpO2 100 %.     Physical Exam   General: in no acute distress  RESP: clear to auscultation bilaterally, non labored  CV: regular rate and rhythm, no murmurs, no edema  ABD: gravid, nontender, BS+  FHTs: 124-130 bpm per Doppler  Fundal height: 38 cm    Cervical: closed, firm, posterior    Current Medications:   Current Outpatient " Medications   Medication Sig Dispense Refill    metFORMIN (GLUCOPHAGE-XR) 500 MG ER 24hr tablet Take 1 tablet (500 mg total) by mouth every evening. 90 tablet 1    PNV,calcium 72-iron-folic acid (WESTAB PLUS) 27 mg iron- 1 mg Tab Take 1 tablet (1 each total) by mouth once daily. 30 tablet 2     No current facility-administered medications for this visit.       Labs:  Urine dipstick (6/23/22)   Color, UA Yellow Ketones, UA Negative   pH, UA 6.0 Urobilinogen, UA 0.2 E.U./dL   WBC, UA Negative Bilirubin, POC Negative   Nitrite, UA Negative Blood, UA Moderate   Protein, POC Negative Clarity, UA Cloudy   Glucose, UA Negative Spec Grav UA 1.020        Initial OB labs    - Blood Type and Rh: O Negative  - Antibody Screen: Negative  - CBC H/H: 12.7/37.5  - HIV: Nonreactive  - RPR: Nonreactive  - GC: Negative  - CT: Negative  - HBsAg: Nonreactive  - HCVAb: Nonreactive  - Rubella: Immune  - Varicella: Immune  - UA & Culture: Negative  - Sickle Cell Screen: Negative  - PAP: NIL    15-20 Weeks Lab  - Quad Screen: Presented for care out of range for screening    28 Week Lab: 04/22/2022  - 1H GTT: 147  - 3 hour GTT: -235-119 (normal)  - Date of Tdap: Not Done    36 Week Lab: 6/16/22  -Rhogam: received today 6/16/22  - CBC H/H: 13.1/38.8  - RPR: non reactive  - GBS Culture: negative  - HIV: non reactive  - Cervical GC: negative  - Cervical CT: negative    Imaging:   Initial US 4/22/22:  single intrauterine gestation breech presentation. There is subjective normal amniotic fluid volume. Placenta anterior unremarkable. Positive fetal heart tones documented rate 159 BPM. SHELBY 18. Fetal measurements are concordant, estimated fetal weight 1311 g    Anatomy Scan 5/24/22: No fetal structural malformations are identified within the limitations of a third trimester exam. AFV is normal. There is no evidence of placenta previa. Presentation cephalic.     Assessment:   1. 37 weeks gestation of pregnancy    2. Rh negative, antepartum     3. H/O  section    4. Gestational diabetes mellitus (GDM) in third trimester, gestational diabetes method of control unspecified    5. Request for sterilization        Plan:  - OB Protocol, PNVs   - Urine dip reviewed as above, UA pending  - Routine labs reviewed  - Patient does not meet diagnostic criteria for gestational DM based on 3 hr GTT normal although patient previously instructed to maintain glucose log. Patient's log revealing >50% abnormal values (scanned into chart) so will treat as gestational DM. Start metformin 500 mg in evening. Continue glucose logs.   - Mother plans to breast feed.  - Postpartum contraception discussion: BTL (forms signed 22)  - OB ED/labor precautions discussed in depth.   - repeat  scheduled 22    Return to clinic in 1 week with NST.      Aleisha Bolden MD  [facility] Resident, HO-1

## 2022-06-30 ENCOUNTER — OFFICE VISIT (OUTPATIENT)
Dept: FAMILY MEDICINE | Facility: CLINIC | Age: 27
End: 2022-06-30
Payer: MEDICAID

## 2022-06-30 VITALS
DIASTOLIC BLOOD PRESSURE: 83 MMHG | BODY MASS INDEX: 38.86 KG/M2 | HEART RATE: 70 BPM | WEIGHT: 227.63 LBS | SYSTOLIC BLOOD PRESSURE: 119 MMHG | HEIGHT: 64 IN | RESPIRATION RATE: 19 BRPM | TEMPERATURE: 98 F | OXYGEN SATURATION: 100 %

## 2022-06-30 DIAGNOSIS — Z98.891 H/O CESAREAN SECTION: ICD-10-CM

## 2022-06-30 DIAGNOSIS — O24.415 GESTATIONAL DIABETES MELLITUS (GDM) IN FIRST TRIMESTER CONTROLLED ON ORAL HYPOGLYCEMIC DRUG: ICD-10-CM

## 2022-06-30 DIAGNOSIS — Z67.41 TYPE O BLOOD, RH NEGATIVE: ICD-10-CM

## 2022-06-30 DIAGNOSIS — Z3A.38 38 WEEKS GESTATION OF PREGNANCY: Primary | ICD-10-CM

## 2022-06-30 LAB
BILIRUB SERPL-MCNC: NORMAL MG/DL
BLOOD URINE, POC: NORMAL
CLARITY, POC UA: NORMAL
COLOR, POC UA: YELLOW
GLUCOSE UR QL STRIP: NORMAL
KETONES UR QL STRIP: NORMAL
LEUKOCYTE ESTERASE URINE, POC: NORMAL
NITRITE, POC UA: NORMAL
PH, POC UA: 6
PROTEIN, POC: 30
SPECIFIC GRAVITY, POC UA: 1.02
UROBILINOGEN, POC UA: 0.2

## 2022-06-30 PROCEDURE — 81002 URINALYSIS NONAUTO W/O SCOPE: CPT | Mod: PBBFAC

## 2022-06-30 PROCEDURE — 99213 OFFICE O/P EST LOW 20 MIN: CPT | Mod: PBBFAC

## 2022-06-30 NOTE — PROGRESS NOTES
FETAL ASSESSMENT REPORT    RE: Sita Lovelace  MRN:  [id number]  :  1995  AGE:  27 y.o.    Date:  2022    REFERRAL PHYSICIAN: Family [facility]    Allergies: Patient has no known allergies.    Sita is a 27 y.o.  at 38w6d gestational age here today for a NST.    2022, by Ultrasound    MEDICATIONS AT TIME OF TEST:  Current Outpatient Medications   Medication Sig Dispense Refill    metFORMIN (GLUCOPHAGE-XR) 500 MG ER 24hr tablet Take 1 tablet (500 mg total) by mouth every evening. 90 tablet 1    PNV,calcium 72-iron-folic acid (WESTAB PLUS) 27 mg iron- 1 mg Tab Take 1 tablet (1 each total) by mouth once daily. 30 tablet 2     No current facility-administered medications for this visit.       Indication: gestational diabetes mellitus    Interpretation:  135 BPM baseline    Variability:  Good {> 6 bpm)    Accelerations:  Reactive    Decelerations:  none    Assessment: reactive    Plan:  NST scheduled, NST reactive      Kevin Hayden MD  2022; 10:52 AM

## 2022-06-30 NOTE — PROGRESS NOTES
[facility] OB OFFICE VISIT NOTE  Sita Lovelace  [id number]  2022      Chief Complaint: High Risk OB visit 36 wks 6 days     line.  #[id number]   Sita Lovelace is a 27 y.o.  female 38w6d by 3rd trimester US (CHERRI Estimated Date of Delivery: 22) presenting to [facility] for prenatal care.     Current Issues: Discomfort in lower abdomen 4-5/10, intermittent, crampy aggravated with fetal movement, multiple times a day, non consistent. Reports currently experiencing  Compliant with Metformin and PNVs. Reports Metformin keeps her up at night.     Chronic Issues:   1. RH negative status- RhoGAM received 22    Gestational History:  G1: 2017, VMI via LTCS @ 38WGA Oligohydramnios  G2: current    Gyn History:   - LMP: ?10/9/2021  - Age at menarche: 11yo  - Menstrual hx:regular before 1st baby, irregular after birth control. monthly, 6 days, heaviest first day, no clots larger than a quarter  - History of birth control: OCPs, Depo  History of STDs and/or Abnormal PAPs: Denies Hx STI. Ulcer on cervix? Dysplasia due to low defenses? s/p bx last pap 2021 in [place]     Past Medical History: denies  Surgical History:   Family History: denies  Social History: denies alcohol, tobacco and illicit drug use  Medications: PNV    Review of Systems  Constitutional: no fever, no chills  CV: no chest pain  RESP: no SOB  : no dysuria, no hematuria  GI: no constipation, no diarrhea, no nausea, no vomiting  Psych: no depression, no anxiety    Antepartum specific   - Fetal movements: yes  - Vaginal bleeding: no  - Vaginal discharge: no  - Loss of fluid: no  - Contractions: no  - Headaches: no  - Vision changes: no  - Edema: no    Vitals:    22 0826   BP: 119/83   Pulse: 70   Resp: 19   Temp: 98.1 °F (36.7 °C)     Wt Readings from Last 2 Encounters:   22 103.2 kg (227 lb 9.6 oz)   22 102.8 kg (226 lb 9.6 oz)       Physical Exam   General: in no acute distress  RESP: clear to  auscultation bilaterally, non labored  CV: regular rate and rhythm, no murmurs, no edema  ABD: gravid, nontender, BS+  FHTs: 135 bpm by NST  Fundal height: 38 cm  Cervical: closed, firm, posterior    Current Medications:   Current Outpatient Medications   Medication Sig Dispense Refill    metFORMIN (GLUCOPHAGE-XR) 500 MG ER 24hr tablet Take 1 tablet (500 mg total) by mouth every evening. 90 tablet 1    PNV,calcium 72-iron-folic acid (WESTAB PLUS) 27 mg iron- 1 mg Tab Take 1 tablet (1 each total) by mouth once daily. 30 tablet 2     No current facility-administered medications for this visit.       Initial OB labs    - Blood Type and Rh: O Negative  - Antibody Screen: Negative  - CBC H/H: 12.7/37.5  - HIV: Nonreactive  - RPR: Nonreactive  - GC: Negative  - CT: Negative  - HBsAg: Nonreactive  - HCVAb: Nonreactive  - Rubella: Immune  - Varicella: Immune  - UA & Culture: Negative  - Sickle Cell Screen: Negative  - PAP: NIL    15-20 Weeks Lab  - Quad Screen: Presented for care out of range for screening    28 Week Lab: 04/22/2022  - 1H GTT: 147  - 3 hour GTT: -293-119 (normal)  - Date of Tdap: Not Done    36 Week Lab: 6/16/22  -Rhogam: received 6/16/22  - CBC H/H: 13.1/38.8  - RPR: non reactive  - GBS Culture: negative  - HIV: non reactive  - Cervical GC: negative  - Cervical CT: negative    Imaging:   Initial US 4/22/22:  single intrauterine gestation breech presentation. There is subjective normal amniotic fluid volume. Placenta anterior unremarkable. Positive fetal heart tones documented rate 159 BPM. SHELBY 18. Fetal measurements are concordant, estimated fetal weight 1311 g    Anatomy Scan 5/24/22: No fetal structural malformations are identified within the limitations of a third trimester exam. AFV is normal. There is no evidence of placenta previa. Presentation cephalic.     Assessment:   1. 38 weeks gestation of pregnancy    2. Gestational diabetes mellitus (GDM) in first trimester controlled on oral  hypoglycemic drug    3. Type O blood, Rh negative    4. H/O  section          Assessment / Plan:  38 weeks gestation of pregnancy  - OB Protocol, PNVs   - Urine dip reviewed as  - Routine labs reviewed  - NST reactive, no contractions seen  - Educated on Clay johnson contractions  - Mother plans to breast feed.  - Postpartum contraception discussion: BTL (forms signed 22)  - OB ED/labor precautions discussed in depth.   -     POCT urine dipstick without microscope  -     Fetal non-stress test; Future; Expected date: 2022    Gestational diabetes mellitus (GDM) in first trimester controlled on oral hypoglycemic drug  - Continue metformin 500 mg, can take in am.    Type O blood, Rh negative  RhoGAM received 22    H/O  section  - repeat  scheduled 22. Address provided to patient, instructed to arrive at 0645       RTC Prn    Victorina Feliciano MD  [employer] [facility] PGY-2

## 2022-07-01 ENCOUNTER — ANESTHESIA EVENT (OUTPATIENT)
Dept: OBSTETRICS AND GYNECOLOGY | Facility: HOSPITAL | Age: 27
End: 2022-07-01
Payer: MEDICAID

## 2022-07-05 ENCOUNTER — ANESTHESIA (OUTPATIENT)
Dept: OBSTETRICS AND GYNECOLOGY | Facility: HOSPITAL | Age: 27
End: 2022-07-05
Payer: MEDICAID

## 2022-07-05 ENCOUNTER — HOSPITAL ENCOUNTER (INPATIENT)
Facility: HOSPITAL | Age: 27
LOS: 3 days | Discharge: HOME | End: 2022-07-08
Attending: OBSTETRICS & GYNECOLOGY | Admitting: OBSTETRICS & GYNECOLOGY
Payer: MEDICAID

## 2022-07-05 DIAGNOSIS — O34.219 PREVIOUS CESAREAN DELIVERY AFFECTING PREGNANCY: ICD-10-CM

## 2022-07-05 LAB
ANTIBODY IDENTIFICATION: NORMAL
BASOPHILS # BLD AUTO: 0.06 X10(3)/MCL (ref 0–0.2)
BASOPHILS NFR BLD AUTO: 0.6 %
EOSINOPHIL # BLD AUTO: 0.22 X10(3)/MCL (ref 0–0.9)
EOSINOPHIL NFR BLD AUTO: 2.1 %
ERYTHROCYTE [DISTWIDTH] IN BLOOD BY AUTOMATED COUNT: 13.3 % (ref 11.5–17)
GROUP & RH: ABNORMAL
HBV SURFACE AG SERPL QL IA: NONREACTIVE
HCT VFR BLD AUTO: 41.1 % (ref 37–47)
HGB BLD-MCNC: 14.4 GM/DL (ref 12–16)
IMM GRANULOCYTES # BLD AUTO: 0.05 X10(3)/MCL (ref 0–0.04)
IMM GRANULOCYTES NFR BLD AUTO: 0.5 %
INDIRECT COOMBS GEL: ABNORMAL
LYMPHOCYTES # BLD AUTO: 2.3 X10(3)/MCL (ref 0.6–4.6)
LYMPHOCYTES NFR BLD AUTO: 22.3 %
MCH RBC QN AUTO: 32.8 PG (ref 27–31)
MCHC RBC AUTO-ENTMCNC: 35 MG/DL (ref 33–36)
MCV RBC AUTO: 93.6 FL (ref 80–94)
MONOCYTES # BLD AUTO: 0.72 X10(3)/MCL (ref 0.1–1.3)
MONOCYTES NFR BLD AUTO: 7 %
NEUTROPHILS # BLD AUTO: 7 X10(3)/MCL (ref 2.1–9.2)
NEUTROPHILS NFR BLD AUTO: 67.5 %
NRBC BLD AUTO-RTO: 0 %
PLATELET # BLD AUTO: 187 X10(3)/MCL (ref 130–400)
PMV BLD AUTO: 10.2 FL (ref 7.4–10.4)
POCT GLUCOSE: 85 MG/DL (ref 70–110)
RBC # BLD AUTO: 4.39 X10(6)/MCL (ref 4.2–5.4)
ROSETTE - FMH (FETAL BLEED SCREEN): NORMAL
T PALLIDUM AB SER QL: NONREACTIVE
WBC # SPEC AUTO: 10.3 X10(3)/MCL (ref 4.5–11.5)

## 2022-07-05 PROCEDURE — 25000003 PHARM REV CODE 250: Performed by: STUDENT IN AN ORGANIZED HEALTH CARE EDUCATION/TRAINING PROGRAM

## 2022-07-05 PROCEDURE — 85025 COMPLETE CBC W/AUTO DIFF WBC: CPT | Performed by: OBSTETRICS & GYNECOLOGY

## 2022-07-05 PROCEDURE — 36004725 HC OB OR TIME LEV III - EA ADD 15 MIN: Mod: SZN

## 2022-07-05 PROCEDURE — 71000033 HC RECOVERY, INTIAL HOUR: Performed by: OBSTETRICS & GYNECOLOGY

## 2022-07-05 PROCEDURE — 86780 TREPONEMA PALLIDUM: CPT | Performed by: OBSTETRICS & GYNECOLOGY

## 2022-07-05 PROCEDURE — 63600175 PHARM REV CODE 636 W HCPCS: Performed by: STUDENT IN AN ORGANIZED HEALTH CARE EDUCATION/TRAINING PROGRAM

## 2022-07-05 PROCEDURE — 87340 HEPATITIS B SURFACE AG IA: CPT | Performed by: OBSTETRICS & GYNECOLOGY

## 2022-07-05 PROCEDURE — 36004725 HC OB OR TIME LEV III - EA ADD 15 MIN: Performed by: OBSTETRICS & GYNECOLOGY

## 2022-07-05 PROCEDURE — 86850 RBC ANTIBODY SCREEN: CPT | Performed by: OBSTETRICS & GYNECOLOGY

## 2022-07-05 PROCEDURE — 51702 INSERT TEMP BLADDER CATH: CPT

## 2022-07-05 PROCEDURE — 37000008 HC ANESTHESIA 1ST 15 MINUTES: Performed by: OBSTETRICS & GYNECOLOGY

## 2022-07-05 PROCEDURE — 27000492 HC SLEEVE, SCD T/L

## 2022-07-05 PROCEDURE — 36004724 HC OB OR TIME LEV III - 1ST 15 MIN: Performed by: OBSTETRICS & GYNECOLOGY

## 2022-07-05 PROCEDURE — 63600175 PHARM REV CODE 636 W HCPCS: Performed by: OBSTETRICS & GYNECOLOGY

## 2022-07-05 PROCEDURE — 63600175 PHARM REV CODE 636 W HCPCS: Performed by: ANESTHESIOLOGY

## 2022-07-05 PROCEDURE — 63600519 RHOGAM PHARM REV CODE 636 ALT 250 W HCPCS: Performed by: STUDENT IN AN ORGANIZED HEALTH CARE EDUCATION/TRAINING PROGRAM

## 2022-07-05 PROCEDURE — 11000001 HC ACUTE MED/SURG PRIVATE ROOM

## 2022-07-05 PROCEDURE — 86870 RBC ANTIBODY IDENTIFICATION: CPT | Performed by: OBSTETRICS & GYNECOLOGY

## 2022-07-05 PROCEDURE — 37000009 HC ANESTHESIA EA ADD 15 MINS: Mod: SZN

## 2022-07-05 PROCEDURE — 85461 HEMOGLOBIN FETAL: CPT | Performed by: STUDENT IN AN ORGANIZED HEALTH CARE EDUCATION/TRAINING PROGRAM

## 2022-07-05 PROCEDURE — 62322 NJX INTERLAMINAR LMBR/SAC: CPT | Performed by: ANESTHESIOLOGY

## 2022-07-05 PROCEDURE — 37000009 HC ANESTHESIA EA ADD 15 MINS: Performed by: OBSTETRICS & GYNECOLOGY

## 2022-07-05 PROCEDURE — 36415 COLL VENOUS BLD VENIPUNCTURE: CPT | Performed by: OBSTETRICS & GYNECOLOGY

## 2022-07-05 PROCEDURE — 63600175 PHARM REV CODE 636 W HCPCS: Performed by: NURSE ANESTHETIST, CERTIFIED REGISTERED

## 2022-07-05 PROCEDURE — 25000003 PHARM REV CODE 250: Performed by: ANESTHESIOLOGY

## 2022-07-05 RX ORDER — MISOPROSTOL 100 UG/1
800 TABLET ORAL
Status: DISCONTINUED | OUTPATIENT
Start: 2022-07-05 | End: 2022-07-05

## 2022-07-05 RX ORDER — OXYCODONE AND ACETAMINOPHEN 5; 325 MG/1; MG/1
1 TABLET ORAL EVERY 4 HOURS PRN
Status: DISCONTINUED | OUTPATIENT
Start: 2022-07-05 | End: 2022-07-08 | Stop reason: HOSPADM

## 2022-07-05 RX ORDER — OXYTOCIN/RINGER'S LACTATE 30/500 ML
334 PLASTIC BAG, INJECTION (ML) INTRAVENOUS ONCE
Status: COMPLETED | OUTPATIENT
Start: 2022-07-05 | End: 2022-07-05

## 2022-07-05 RX ORDER — CEFAZOLIN SODIUM 2 G/50ML
2 SOLUTION INTRAVENOUS
Status: DISCONTINUED | OUTPATIENT
Start: 2022-07-05 | End: 2022-07-05

## 2022-07-05 RX ORDER — ACETAMINOPHEN 10 MG/ML
INJECTION, SOLUTION INTRAVENOUS
Status: DISCONTINUED | OUTPATIENT
Start: 2022-07-05 | End: 2022-07-05

## 2022-07-05 RX ORDER — MORPHINE SULFATE 10 MG/ML
10 INJECTION INTRAMUSCULAR; INTRAVENOUS; SUBCUTANEOUS EVERY 4 HOURS PRN
Status: DISCONTINUED | OUTPATIENT
Start: 2022-07-05 | End: 2022-07-06

## 2022-07-05 RX ORDER — SODIUM CHLORIDE, SODIUM LACTATE, POTASSIUM CHLORIDE, CALCIUM CHLORIDE 600; 310; 30; 20 MG/100ML; MG/100ML; MG/100ML; MG/100ML
INJECTION, SOLUTION INTRAVENOUS CONTINUOUS
Status: DISCONTINUED | OUTPATIENT
Start: 2022-07-05 | End: 2022-07-06

## 2022-07-05 RX ORDER — OXYTOCIN/RINGER'S LACTATE 30/500 ML
334 PLASTIC BAG, INJECTION (ML) INTRAVENOUS ONCE
Status: DISCONTINUED | OUTPATIENT
Start: 2022-07-05 | End: 2022-07-05

## 2022-07-05 RX ORDER — CARBOPROST TROMETHAMINE 250 UG/ML
250 INJECTION, SOLUTION INTRAMUSCULAR
Status: DISCONTINUED | OUTPATIENT
Start: 2022-07-05 | End: 2022-07-05

## 2022-07-05 RX ORDER — OXYTOCIN/RINGER'S LACTATE 30/500 ML
95 PLASTIC BAG, INJECTION (ML) INTRAVENOUS ONCE
Status: DISCONTINUED | OUTPATIENT
Start: 2022-07-05 | End: 2022-07-05

## 2022-07-05 RX ORDER — MORPHINE SULFATE 4 MG/ML
4 INJECTION, SOLUTION INTRAMUSCULAR; INTRAVENOUS EVERY 4 HOURS PRN
Status: DISCONTINUED | OUTPATIENT
Start: 2022-07-05 | End: 2022-07-06

## 2022-07-05 RX ORDER — OXYCODONE AND ACETAMINOPHEN 10; 325 MG/1; MG/1
1 TABLET ORAL EVERY 4 HOURS PRN
Status: DISCONTINUED | OUTPATIENT
Start: 2022-07-05 | End: 2022-07-08 | Stop reason: HOSPADM

## 2022-07-05 RX ORDER — DIPHENHYDRAMINE HCL 25 MG
25 CAPSULE ORAL EVERY 4 HOURS PRN
Status: DISCONTINUED | OUTPATIENT
Start: 2022-07-05 | End: 2022-07-08 | Stop reason: HOSPADM

## 2022-07-05 RX ORDER — METHYLERGONOVINE MALEATE 0.2 MG/ML
200 INJECTION INTRAVENOUS
Status: DISCONTINUED | OUTPATIENT
Start: 2022-07-05 | End: 2022-07-05

## 2022-07-05 RX ORDER — KETOROLAC TROMETHAMINE 30 MG/ML
30 INJECTION, SOLUTION INTRAMUSCULAR; INTRAVENOUS EVERY 6 HOURS
Status: DISCONTINUED | OUTPATIENT
Start: 2022-07-05 | End: 2022-07-05

## 2022-07-05 RX ORDER — SIMETHICONE 80 MG
1 TABLET,CHEWABLE ORAL EVERY 6 HOURS PRN
Status: DISCONTINUED | OUTPATIENT
Start: 2022-07-05 | End: 2022-07-08 | Stop reason: HOSPADM

## 2022-07-05 RX ORDER — BUPIVACAINE HYDROCHLORIDE 7.5 MG/ML
INJECTION, SOLUTION EPIDURAL; RETROBULBAR
Status: COMPLETED | OUTPATIENT
Start: 2022-07-05 | End: 2022-07-05

## 2022-07-05 RX ORDER — ACETAMINOPHEN 325 MG/1
975 TABLET ORAL EVERY 8 HOURS
Status: DISCONTINUED | OUTPATIENT
Start: 2022-07-05 | End: 2022-07-07

## 2022-07-05 RX ORDER — BUPIVACAINE HYDROCHLORIDE 7.5 MG/ML
INJECTION, SOLUTION EPIDURAL; RETROBULBAR
Status: DISCONTINUED | OUTPATIENT
Start: 2022-07-05 | End: 2022-07-05

## 2022-07-05 RX ORDER — SODIUM CITRATE AND CITRIC ACID MONOHYDRATE 334; 500 MG/5ML; MG/5ML
30 SOLUTION ORAL
Status: DISCONTINUED | OUTPATIENT
Start: 2022-07-05 | End: 2022-07-05

## 2022-07-05 RX ORDER — IBUPROFEN 800 MG/1
800 TABLET ORAL EVERY 8 HOURS
Status: DISCONTINUED | OUTPATIENT
Start: 2022-07-06 | End: 2022-07-06

## 2022-07-05 RX ORDER — BISACODYL 10 MG
10 SUPPOSITORY, RECTAL RECTAL ONCE AS NEEDED
Status: DISCONTINUED | OUTPATIENT
Start: 2022-07-05 | End: 2022-07-08 | Stop reason: HOSPADM

## 2022-07-05 RX ORDER — SODIUM CHLORIDE 0.9 % (FLUSH) 0.9 %
10 SYRINGE (ML) INJECTION
Status: DISCONTINUED | OUTPATIENT
Start: 2022-07-05 | End: 2022-07-08 | Stop reason: HOSPADM

## 2022-07-05 RX ORDER — SODIUM CITRATE AND CITRIC ACID MONOHYDRATE 334; 500 MG/5ML; MG/5ML
30 SOLUTION ORAL ONCE
Status: COMPLETED | OUTPATIENT
Start: 2022-07-05 | End: 2022-07-05

## 2022-07-05 RX ORDER — CEFAZOLIN SODIUM IN 0.9 % NACL 2 G/100 ML
PLASTIC BAG, INJECTION (ML) INTRAVENOUS
Status: DISCONTINUED | OUTPATIENT
Start: 2022-07-05 | End: 2022-07-05

## 2022-07-05 RX ORDER — AMOXICILLIN 250 MG
1 CAPSULE ORAL NIGHTLY PRN
Status: DISCONTINUED | OUTPATIENT
Start: 2022-07-05 | End: 2022-07-08 | Stop reason: HOSPADM

## 2022-07-05 RX ORDER — MORPHINE SULFATE 4 MG/ML
INJECTION, SOLUTION INTRAMUSCULAR; INTRAVENOUS
Status: DISPENSED
Start: 2022-07-05 | End: 2022-07-06

## 2022-07-05 RX ORDER — SODIUM CHLORIDE, SODIUM LACTATE, POTASSIUM CHLORIDE, CALCIUM CHLORIDE 600; 310; 30; 20 MG/100ML; MG/100ML; MG/100ML; MG/100ML
INJECTION, SOLUTION INTRAVENOUS CONTINUOUS
Status: DISCONTINUED | OUTPATIENT
Start: 2022-07-05 | End: 2022-07-05

## 2022-07-05 RX ORDER — PHENYLEPHRINE HYDROCHLORIDE 10 MG/ML
INJECTION INTRAVENOUS
Status: DISCONTINUED | OUTPATIENT
Start: 2022-07-05 | End: 2022-07-05

## 2022-07-05 RX ORDER — MORPHINE SULFATE 1 MG/ML
INJECTION, SOLUTION EPIDURAL; INTRATHECAL; INTRAVENOUS
Status: DISCONTINUED | OUTPATIENT
Start: 2022-07-05 | End: 2022-07-05

## 2022-07-05 RX ORDER — DOCUSATE SODIUM 100 MG/1
200 CAPSULE, LIQUID FILLED ORAL 2 TIMES DAILY
Status: DISCONTINUED | OUTPATIENT
Start: 2022-07-05 | End: 2022-07-08 | Stop reason: HOSPADM

## 2022-07-05 RX ORDER — PROCHLORPERAZINE EDISYLATE 5 MG/ML
5 INJECTION INTRAMUSCULAR; INTRAVENOUS EVERY 6 HOURS PRN
Status: DISCONTINUED | OUTPATIENT
Start: 2022-07-05 | End: 2022-07-08 | Stop reason: HOSPADM

## 2022-07-05 RX ORDER — ADHESIVE BANDAGE
30 BANDAGE TOPICAL 2 TIMES DAILY PRN
Status: DISCONTINUED | OUTPATIENT
Start: 2022-07-06 | End: 2022-07-08 | Stop reason: HOSPADM

## 2022-07-05 RX ORDER — KETOROLAC TROMETHAMINE 30 MG/ML
30 INJECTION, SOLUTION INTRAMUSCULAR; INTRAVENOUS EVERY 6 HOURS
Status: DISCONTINUED | OUTPATIENT
Start: 2022-07-05 | End: 2022-07-06

## 2022-07-05 RX ORDER — ONDANSETRON 2 MG/ML
INJECTION INTRAMUSCULAR; INTRAVENOUS
Status: DISCONTINUED | OUTPATIENT
Start: 2022-07-05 | End: 2022-07-05

## 2022-07-05 RX ORDER — ONDANSETRON 4 MG/1
8 TABLET, ORALLY DISINTEGRATING ORAL EVERY 8 HOURS PRN
Status: DISCONTINUED | OUTPATIENT
Start: 2022-07-05 | End: 2022-07-08 | Stop reason: HOSPADM

## 2022-07-05 RX ORDER — KETOROLAC TROMETHAMINE 30 MG/ML
INJECTION, SOLUTION INTRAMUSCULAR; INTRAVENOUS
Status: DISCONTINUED | OUTPATIENT
Start: 2022-07-05 | End: 2022-07-05

## 2022-07-05 RX ORDER — CEFAZOLIN SODIUM 2 G/50ML
2 SOLUTION INTRAVENOUS
Status: COMPLETED | OUTPATIENT
Start: 2022-07-05 | End: 2022-07-05

## 2022-07-05 RX ORDER — OXYTOCIN/RINGER'S LACTATE 30/500 ML
95 PLASTIC BAG, INJECTION (ML) INTRAVENOUS ONCE
Status: DISCONTINUED | OUTPATIENT
Start: 2022-07-05 | End: 2022-07-08 | Stop reason: HOSPADM

## 2022-07-05 RX ORDER — PRENATAL WITH FERROUS FUM AND FOLIC ACID 3080; 920; 120; 400; 22; 1.84; 3; 20; 10; 1; 12; 200; 27; 25; 2 [IU]/1; [IU]/1; MG/1; [IU]/1; MG/1; MG/1; MG/1; MG/1; MG/1; MG/1; UG/1; MG/1; MG/1; MG/1; MG/1
1 TABLET ORAL DAILY
Status: DISCONTINUED | OUTPATIENT
Start: 2022-07-05 | End: 2022-07-08 | Stop reason: HOSPADM

## 2022-07-05 RX ORDER — FAMOTIDINE 10 MG/ML
20 INJECTION INTRAVENOUS
Status: DISCONTINUED | OUTPATIENT
Start: 2022-07-05 | End: 2022-07-05

## 2022-07-05 RX ORDER — FENTANYL CITRATE 50 UG/ML
INJECTION, SOLUTION INTRAMUSCULAR; INTRAVENOUS
Status: DISCONTINUED | OUTPATIENT
Start: 2022-07-05 | End: 2022-07-05

## 2022-07-05 RX ADMIN — OXYCODONE AND ACETAMINOPHEN 1 TABLET: 10; 325 TABLET ORAL at 11:07

## 2022-07-05 RX ADMIN — HUMAN RHO(D) IMMUNE GLOBULIN 300 MCG: 1500 SOLUTION INTRAMUSCULAR; INTRAVENOUS at 02:07

## 2022-07-05 RX ADMIN — DOCUSATE SODIUM 200 MG: 100 CAPSULE, LIQUID FILLED ORAL at 09:07

## 2022-07-05 RX ADMIN — MORPHINE SULFATE 4 MG: 4 INJECTION INTRAVENOUS at 02:07

## 2022-07-05 RX ADMIN — KETOROLAC TROMETHAMINE 30 MG: 30 INJECTION, SOLUTION INTRAMUSCULAR at 05:07

## 2022-07-05 RX ADMIN — ONDANSETRON 4 MG: 2 INJECTION INTRAMUSCULAR; INTRAVENOUS at 09:07

## 2022-07-05 RX ADMIN — MORPHINE SULFATE 0.1 MG: 1 INJECTION, SOLUTION EPIDURAL; INTRATHECAL; INTRAVENOUS at 09:07

## 2022-07-05 RX ADMIN — PHENYLEPHRINE HYDROCHLORIDE 50 MCG: 10 INJECTION INTRAVENOUS at 09:07

## 2022-07-05 RX ADMIN — DIPHENHYDRAMINE HYDROCHLORIDE 25 MG: 25 CAPSULE ORAL at 02:07

## 2022-07-05 RX ADMIN — CEFAZOLIN SODIUM 2 G: 2 SOLUTION INTRAVENOUS at 09:07

## 2022-07-05 RX ADMIN — KETOROLAC TROMETHAMINE 30 MG: 30 INJECTION, SOLUTION INTRAMUSCULAR at 11:07

## 2022-07-05 RX ADMIN — SODIUM CHLORIDE, POTASSIUM CHLORIDE, SODIUM LACTATE AND CALCIUM CHLORIDE: 600; 310; 30; 20 INJECTION, SOLUTION INTRAVENOUS at 09:07

## 2022-07-05 RX ADMIN — ACETAMINOPHEN 1000 MG: 10 INJECTION, SOLUTION INTRAVENOUS at 10:07

## 2022-07-05 RX ADMIN — Medication 500 ML: at 11:07

## 2022-07-05 RX ADMIN — SODIUM CITRATE AND CITRIC ACID MONOHYDRATE 30 ML: 500; 334 SOLUTION ORAL at 07:07

## 2022-07-05 RX ADMIN — BUPIVACAINE HYDROCHLORIDE 1.8 ML: 7.5 INJECTION, SOLUTION EPIDURAL; RETROBULBAR at 09:07

## 2022-07-05 RX ADMIN — SIMETHICONE 80 MG: 80 TABLET, CHEWABLE ORAL at 09:07

## 2022-07-05 RX ADMIN — SODIUM CHLORIDE, POTASSIUM CHLORIDE, SODIUM LACTATE AND CALCIUM CHLORIDE: 600; 310; 30; 20 INJECTION, SOLUTION INTRAVENOUS at 04:07

## 2022-07-05 RX ADMIN — PHENYLEPHRINE HYDROCHLORIDE 100 MCG: 10 INJECTION INTRAVENOUS at 10:07

## 2022-07-05 RX ADMIN — FENTANYL CITRATE 10 MCG: 50 INJECTION, SOLUTION INTRAMUSCULAR; INTRAVENOUS at 09:07

## 2022-07-05 RX ADMIN — PHENYLEPHRINE HYDROCHLORIDE 75 MCG: 10 INJECTION INTRAVENOUS at 09:07

## 2022-07-05 RX ADMIN — SODIUM CHLORIDE, POTASSIUM CHLORIDE, SODIUM LACTATE AND CALCIUM CHLORIDE 1000 ML: 600; 310; 30; 20 INJECTION, SOLUTION INTRAVENOUS at 07:07

## 2022-07-05 NOTE — ANESTHESIA PROCEDURE NOTES
Spinal    Diagnosis: Repeat C/S  Patient location during procedure: OB  Start time: 7/5/2022 9:34 AM  Timeout: 7/5/2022 9:33 AM  End time: 7/5/2022 9:40 AM    Staffing  Authorizing Provider: Jerome Mccormick MD  Performing Provider: Jerome Mccormick MD    Preanesthetic Checklist  Completed: patient identified, IV checked, site marked, risks and benefits discussed, surgical consent, monitors and equipment checked, pre-op evaluation and timeout performed  Spinal Block  Patient position: sitting  Prep: ChloraPrep  Patient monitoring: heart rate, cardiac monitor, continuous pulse ox and frequent blood pressure checks  Approach: midline  Location: L3-4  Injection technique: single shot  CSF Fluid: clear free-flowing CSF  Needle  Needle type: pencil-tip   Needle gauge: 25 G  Needle length: 3.5 in  Additional Documentation: incremental injection, negative aspiration for heme and no paresthesia on injection  Needle localization: anatomical landmarks  Assessment  Sensory level: T5  Ease of block: moderate  Patient's tolerance of the procedure: comfortable throughout block  Medications:    Medications: bupivacaine (pf) (MARCAINE) injection 0.75% - Intraspinal   1.8 mL - 7/5/2022 9:40:00 AM

## 2022-07-05 NOTE — H&P
HISTORY AND PHYSICAL                                                OBSTETRICS          Subjective:      Stia Lovelace is a 27 y.o.  female with IUP at 39w4d weeks gestation who presents to L&D for repeat  section and tubal sterilization. Pertinent medical history for this pregnancy includes Rh negative status, GDMA2, h/o CSx1.  She denies contractions, vaginal bleeding, leakage of fluid.  Reports normal fetal movement.     PMHx:   Past Medical History:   Diagnosis Date    Gestational diabetes mellitus (GDM) in third trimester        PSHx:   Past Surgical History:   Procedure Laterality Date     SECTION         All: Review of patient's allergies indicates:  No Known Allergies    Meds:   Medications Prior to Admission   Medication Sig Dispense Refill Last Dose    metFORMIN (GLUCOPHAGE-XR) 500 MG ER 24hr tablet Take 1 tablet (500 mg total) by mouth every evening. 90 tablet 1 2022 at Unknown time    PNV,calcium 72-iron-folic acid (WESTAB PLUS) 27 mg iron- 1 mg Tab Take 1 tablet (1 each total) by mouth once daily. 30 tablet 2 2022 at Unknown time       SH:   Social History     Socioeconomic History    Marital status: Single   Tobacco Use    Smoking status: Never Smoker    Smokeless tobacco: Never Used   Substance and Sexual Activity    Alcohol use: Never    Drug use: Never    Sexual activity: Yes       FH: History reviewed. No pertinent family history.    OBHx:   OB History    Para Term  AB Living   2 1 1 0 0 1   SAB IAB Ectopic Multiple Live Births   0 0 0 0 1      # Outcome Date GA Lbr [place]/2nd Weight Sex Delivery Anes PTL Lv   2 Current            1 Term 17 38w0d  3.317 kg (7 lb 5 oz) M CS-LTranv EPI N BILLIE       Objective:      /83 (BP Location: Left arm, Patient Position: Lying)   Pulse 75   Temp 98.8 °F (37.1 °C) (Oral)   Resp 20   LMP  (LMP Unknown)   Breastfeeding No   There is no height or weight on file to calculate BMI.    General:    alert and cooperative   HEENT:  normocephalic, atraumatic   Lungs:   clear to auscultation bilaterally   Heart:   regular rate and rhythm, S1, S2 normal   Abdomen:  gravid, non-tender   Extremities non-tender, no edema   Derm: no rashes or lesions   Psych: appropriate mood and affect     Lab Review  Blood Type O NEG  GBBS: unknown   Rubella: Immune   RPR: NR   HIV: NR  HepB: NR     Assessment:     27 y.o.  at 39w4d weeks gestation here for repeat  delivery with tubal sterilization.  2. H/o  delivery x 1     Plan:        1. Risks, benefits, alternatives and possible complications have been discussed in detail with the patient. All questions have been answered, and Ms. Lovelace has voiced understanding and agrees to the treatment plan.  2. Consents signed and in chart. Medicaid consent signed 22  3. Admit to Labor and Delivery unit for repeat  delivery with tubal sterilization  4. Rhogam evaluation postpartum for Rh negative status  5. 2hr GTT postpartum for GDMA2    Discussed with Dr. Baldemar Dickinson MD  [employer] [facility] PGY2  22 9:35 AM

## 2022-07-05 NOTE — TRANSFER OF CARE
Anesthesia Transfer of Care Note    Patient: Sita Lovelace    Procedure(s) Performed: Procedure(s) (LRB):   SECTION (N/A)    Patient location: Labor and Delivery    Anesthesia Type: spinal    Transport from OR: Transported from OR on room air with adequate spontaneous ventilation    Post pain: adequate analgesia    Post vital signs: stable    Level of consciousness: awake    Nausea/Vomiting: no nausea/vomiting    Complications: none    Transfer of care protocol was followed      Last vitals:   Visit Vitals  /60 (BP Location: Right arm, Patient Position: Lying)   Pulse 69   Temp 36.4 °C (97.5 °F) (Oral)   Resp 14   LMP  (LMP Unknown)   SpO2 96%   Breastfeeding No

## 2022-07-05 NOTE — L&D DELIVERY NOTE
[facility] - Labor and Delivery   Section   Operative Note    SUMMARY     Date of Procedure: 2022     Procedure: Procedure(s) (LRB):   SECTION (N/A)    Surgeon(s) and Role:     * Bharathi Mcdermott MD - Primary    Assisting Surgeon: Gloria Dickinson MD    Pre-Operative Diagnosis: Term pregnancy, repeat [Z34.90], Desires sterilization    Post-Operative Diagnosis: Post-Op Diagnosis Codes:     * Term pregnancy, repeat [Z34.90]    Desires sterilization    Anesthesia: Spinal    Technical Procedures Used: Low transverse Cesarea Section, [place] bilateral tubal ligation           Description of the Findings of the Procedure: Fetus in vertex position. Normal uterus, fallopian tubes, ovaries noted.     Significant Surgical Tasks Conducted by the Assistant(s), if Applicable: N/A    Complications: No    Blood Loss: 600 mL     Specimens:   Specimen (24h ago, onward)                 Start     Ordered    22 1022  Specimen to Pathology Gynecology and Obstetrics  Once        References:    Click here for ordering Quick Tip   Question Answer Comment   Procedure Type: Gynecology and Obstetrics    Specimen Source Fallopian Tube, Left    Specimen Source Fallopian Tube, Right    Specimen Class: Routine/Screening    Clinical Information: IUP 39.4 GDM RCS with BTL    Release to patient Immediate        22 1022                    Condition: Good    Disposition: PACU - hemodynamically stable.    Attestation: Good    Indication and Consent:  Patient is a 27 y.o. yo  at 39w4d who presented to labor and delivery for scheduled repeat low transverse  section and bilateral tubal sterilization for undesired fertility. The patient understood that the risks of  section include, but are not limited, visceral or vascular injury, infection, blood loss with need for transfusion, prolonged hospitalization, and need for reoperation. The patient stated understanding and desired to proceed.  All questions were answered.     Procedure:  Patient was taken to the operating room with IV fluids running. Spinal anesthesia was obtained without difficulty. She was positioned in the dorsal supine position. Johansen catheter was placed to drain the bladder, and Bovie grounding pad placed to the left thigh. She was prepared and draped in the normal sterile fashion. A time-out was performed.      A pfannenstiel incision was made with the scalpel. The incision was carried down to the fascia with the scalpel. The fascia was incised and extended laterally. The superior aspect of the fascia was grasped with the Kocher clamps, and the underlying rectus muscles were dissected off sharply with the Osman scissors. In a similar fashion, the inferior aspect of the fascia was grasped and elevated, and the underlying rectus and pyramidalis muscles were dissected off sharply. The rectus muscles were  in the midline down to the level of the pubic symphysis. Pre-peritoneal fatty tissue was bluntly dissected to expose the peritoneum. The peritoneum was found to be free of adherent bowel and entered bluntly. The peritoneal incision was extended with blunt traction.      The bladder blade was inserted. A bladder flap was developed with the Metzenbaum scissors. The bladder blade was repositioned to keep the bladder out of the operative field. A transverse incision was made on the lower uterine segment using the scalpel. The incision was extended bluntly with lateral and upward traction. Membranes were ruptured with a hemostat and clear fluid was noted.      The fetus was found to be vertex presentation. The head was elevated out of the pelvis, and with gentle fundal pressure the infant was delivered with no difficulty. Mouth and nose were suctioned with a bulb. The cord was clamped x2 and cut. The infant was handed off to the awaiting  nurse practitioner. Cord blood was collected. The placenta was delivered intact by  manual massage of the uterus. The uterus was then exteriorized. The inside of the uterus was gently wiped with a lap sponge to assure complete removal of placental membranes. The inferior midline angle of the hysterotomy were identified and clamped with [place]away. The hysterotomy was closed with Chromic 1 suture in a running and locked fashion.  suture. The incision was inspected and found to be hemostatic. Bilateral fallopian tubes and ovaries were normal in appearance.      Attention was then paid to tubal sterilization portion of procedure. A Caden clamp was used to grasp the right fallopian tube and the fimbriated end was confirmed prior to proceeding. Bruning technique was completed by double tying using an 0 Vicryl on both sides of the caden, then incising the fallopian tube between the two ties. Adequate hemostasis was noted. This tube was placed back in its anatomic position. The left fallopian tube was grasped and the same process was repeated, again being sure to identify the fibriated ends before proceeding.     The uterus, and ovaries were returned to the abdomen. Blood clots and fluid were wiped out of the abdomen and pelvis with moist laparotomy sponges. The uterine incision was again inspected after irrigation and found to be hemostatic.      The peritoneum was closed with chromic 2-0 suture in a continuous running fashion. The rectus muscles were reapproximated with chromic 2-0 suture in a U-stitch. The rectus muscles were inspected, and hemostasis was achieved with a bovie. The fascial layer was closed with Vicryl 1 suture in a continuous running fashion. The subcutaneous layer was irrigated, and hemostasis was achieved with the Bovie. The subcutaneous layer was reapproximated with 2-0 plain gut suture in a simple interrupted fashion. The skin was closed with Monocryl 4-0 suture in a subcuticular fashion.      The patient tolerated the procedure well. All counts were correct x2. The patient  "was taken to the recovery room in a stable condition.      Dr. Mcdermott was present and scrubbed for the entire procedure.       Delivery Information for Cirilo Lovelace    Birth information:  YOB: 2021   Time of birth: 10:15 AM   Sex: male   Head Delivery Date/Time: 2022 10:15 AM   Delivery type: , Low Transverse   Gestational Age: 39w4d    Delivery Providers    Delivering clinician: Bharathi Mcdermott MD   Provider Role    Gloria Dickinson MD 1st Call Resident    Brittani Juárez, KERRIE Registered Nurse    Katya Orantes, RN Registered Nurse    Naz Espinoza, RN Registered Nurse    Aicha Aguiar, RRT Respiratory Therapist    Dot Lazo,  Technician              Measurements    Weight: 3856 g  Weight (lbs): 8 lb 8 oz  Length: 52.1 cm  Length (in): 20.5"  Head circumference: 34 cm         Apgars    Living status: Living  Apgars:  1 min.:  5 min.:  10 min.:  15 min.:  20 min.:    Skin color:  1  1       Heart rate:  2  2       Reflex irritability:  2  2       Muscle tone:  2  2       Respiratory effort:  2  2       Total:  9  9       Apgars assigned by: MARILYN ESPINOZA         Operative Delivery    Forceps attempted?: No  Vacuum extractor attempted?: No         Shoulder Dystocia    Shoulder dystocia present?: No           Presentation    Presentation: Vertex           Interventions/Resuscitation    Method: Bulb Suctioning, Tactile Stimulation       Cord    Vessels: 3 vessels  Complications: None  Delayed Cord Clamping?: No  Cord Blood Disposition: Sent with Baby  Gases Sent?: No  Stem Cell Collection (by MD): No       Placenta    Placenta delivery date/time: 2022 1016  Placenta removal: Manual removal  Placenta appearance: Intact  Placenta disposition: discarded           Labor Events:       labor: No     Labor Onset Date/Time:         Dilation Complete Date/Time:         Start Pushing Date/Time:       Rupture Date/Time: 22  1014         Rupture type: ARM (Artificial Rupture)  "        Fluid Amount:       Fluid Color: Clear       steroids: None     Antibiotics given for GBS: No     Induction:       Indications for induction:        Augmentation:       Indications for augmentation:       Labor complications: None     Additional complications:          Cervical ripening:                     Delivery:      Episiotomy:       Indication for Episiotomy:       Perineal Lacerations:   Repaired:      Periurethral Laceration:   Repaired:     Labial Laceration:   Repaired:     Sulcus Laceration:   Repaired:     Vaginal Laceration:   Repaired:     Cervical Laceration:   Repaired:     Repair suture:       Repair # of packets:       Last Value - EBL - Nursing (mL):       Sum - EBL - Nursing (mL): 0     Last Value - EBL - Anesthesia (mL):      Calculated QBL (mL):       Vaginal Sweep Performed:       Surgicount Correct:         Other providers:       Anesthesia    Method: Spinal          Details (if applicable):  Trial of Labor No    Categorization: Repeat    Priority: Routine   Indications for :     Incision Type: low transverse     Additional  information:  Forceps:    Vacuum:    Breech:    Observed anomalies    Other (Comments):           Gloria Dickinson MD  [employer] [facility] PGY2  2022 11:28 AM    I have reviewed the notes and procedures performed by Dr Dickinson, I concur with her/his documentation of Sita Lovelace.

## 2022-07-05 NOTE — ANESTHESIA POSTPROCEDURE EVALUATION
Anesthesia Post Evaluation    Patient: Sita Lovelace    Procedure(s) Performed: Procedure(s) (LRB):   SECTION (N/A)    Final Anesthesia Type: spinal      Patient location during evaluation: floor  Patient participation: Yes- Able to Participate  Level of consciousness: oriented and awake  Post-procedure vital signs: reviewed and stable  Pain management: adequate  Airway patency: patent    PONV status at discharge: No PONV  Anesthetic complications: no      Cardiovascular status: blood pressure returned to baseline and stable  Respiratory status: spontaneous ventilation and unassisted  Hydration status: euvolemic  Follow-up not needed.  Comments: [facility]       Neuro: resolving neuraxial blk.    Vitals Value Taken Time   /74 22 1255   Temp 36.6 °C (97.9 °F) 22 1225   Pulse 59 22 1255   Resp 20 22 1255   SpO2 100 % 22 1215         Event Time   Out of Recovery 12:15:00         Pain/Teodoro Score: Teodoro Score: 9 (2022 12:15 PM)

## 2022-07-05 NOTE — OP NOTE
Findings: VMI/VFI in vertex presentation with Apgars 9/9 and weight of 3856g. Normal-appearing uterus, bilateral fallopian tubes, and ovaries.    Indication and Consent:  Patient is a 27 y.o. yo  at 39w4d who presented to labor and delivery for scheduled repeat low transverse  section and bilateral tubal sterilization. The patient understood that the risks of  section include, but are not limited, visceral or vascular injury, infection, blood loss with need for transfusion, prolonged hospitalization, and need for reoperation. The patient stated understanding and desired to proceed. All questions were answered.    Procedure:  Patient was taken to the operating room with IV fluids running. Spinal anesthesia was obtained without difficulty. She was positioned in the dorsal supine position. Johansen catheter was placed to drain the bladder, and Bovie grounding pad placed to the left thigh. She was prepared and draped in the normal sterile fashion. A time-out was performed.     A pfannenstiel incision was made with the scalpel. The incision was carried down to the fascia with the scalpel. The fascia was incised and extended laterally. The superior aspect of the fascia was grasped with the Kocher clamps, and the underlying rectus muscles were dissected off sharply with the Osman scissors. In a similar fashion, the inferior aspect of the fascia was grasped and elevated, and the underlying rectus and pyramidalis muscles were dissected off sharply. The rectus muscles were  in the midline down to the level of the pubic symphysis. Pre-peritoneal fatty tissue was bluntly dissected to expose the peritoneum. The peritoneum was found to be free of adherent bowel and entered bluntly. The peritoneal incision was extended with blunt traction.     The bladder blade was inserted. A bladder flap was developed with the Metzenbaum scissors. The bladder blade was repositioned to keep the bladder out of the  operative field. A transverse incision was made on the lower uterine segment using the scalpel. The incision was extended bluntly with lateral and upward traction. Membranes were ruptured with a hemostat and clear fluid was noted.     The fetus was found to be vertex presentation. The head was elevated out of the pelvis, and with gentle fundal pressure the infant was delivered with no difficulty. Mouth and nose were suctioned with a bulb. The cord was clamped x2 and cut. The infant was handed off to the awaiting  nurse practitioner. Cord blood was collected. The placenta was delivered intact by manual massage of the uterus. The uterus was then exteriorized. The inside of the uterus was gently wiped with a lap sponge to assure complete removal of placental membranes. The inferior midline angle of the hysterotomy were identified and clamped with Callaway. The hysterotomy was closed with Chromic 1 suture in a running and locked fashion.  suture. The incision was inspected and found to be hemostatic. Bilateral fallopian tubes and ovaries were normal in appearance.     Attention was then paid to tubal sterilization portion of procedure. A Caden clamp was used to grasp the right fallopian tube and the fimbriated end was confirmed prior to proceeding. A [place] technique was completed by double tying using an 0 Vicryl on both sides of the caden, then incising the fallopian tube between the two ties. Adequate hemostasis was noted. This tube was placed back in its anatomic position. The left fallopian tube was grasped and the same process was repeated, again being sure to identify the fibriated ends before proceeding.    The uterus, and ovaries were returned to the abdomen. Blood clots and fluid were wiped out of the abdomen and pelvis with moist laparotomy sponges. The uterine incision was again inspected after irrigation and found to be hemostatic.     The peritoneum was closed with chromic 2-0 suture in a  continuous running fashion. The rectus muscles were reapproximated with chromic 2-0 suture in a U-stitch. The rectus muscles were inspected, and hemostasis was achieved with a bovie. The fascial layer was closed with Vicryl 1 suture in a continuous running fashion. The subcutaneous layer was irrigated, and hemostasis was achieved with the Bovie. The subcutaneous layer was reapproximated with 2-0 plain gut suture in a simple interrupted fashion. The skin was closed with Monocryl 4-0 suture in a subcuticular fashion.     The patient tolerated the procedure well. All counts were correct x2. The patient was taken to the recovery room in a stable condition.     Dr. Mcdermott was present and scrubbed for the entire procedure.       Gloria Dickinson MD  [facility] PGY2  07/05/2022 11:24 AM

## 2022-07-05 NOTE — ANESTHESIA PREPROCEDURE EVALUATION
2022  Sita Lovelace is a 27 y.o., female , 39EGA wks, H/O Gestational DM, Oligohydramnios and Breech Presentation who presents for a C/S.      Pre-op Assessment    I have reviewed the Patient Summary Reports.    I have reviewed the NPO Status.   I have reviewed the Medications.     Review of Systems  Anesthesia Hx:  No problems with previous Anesthesia    Endocrine:   Diabetes  Obesity / BMI > 30      Physical Exam  General: Alert and Oriented  Obese  Airway:  Mallampati: II   Mouth Opening: Normal  TM Distance: Normal  Tongue: Normal  Neck ROM: Normal ROM    Dental:  Intact    Chest/Lungs:  Normal Respiratory Rate    Heart:  Rate: Normal  Rhythm: Regular Rhythm        Anesthesia Plan  Type of Anesthesia, risks & benefits discussed:    Anesthesia Type: Spinal  Intra-op Monitoring Plan: Standard ASA Monitors  Post Op Pain Control Plan: intrathecal opioid  Induction:  IV  Airway Plan: Direct  Informed Consent: Informed consent signed with the Patient and all parties understand the risks and agree with anesthesia plan.  All questions answered. Patient consented to blood products? Yes  ASA Score: 2  Day of Surgery Review of History & Physical: H&P Update referred to the surgeon/provider.    Ready For Surgery From Anesthesia Perspective.     .

## 2022-07-06 ENCOUNTER — ANESTHESIA (OUTPATIENT)
Dept: OBSTETRICS AND GYNECOLOGY | Facility: HOSPITAL | Age: 27
End: 2022-07-06

## 2022-07-06 ENCOUNTER — ANESTHESIA EVENT (OUTPATIENT)
Dept: OBSTETRICS AND GYNECOLOGY | Facility: HOSPITAL | Age: 27
End: 2022-07-06

## 2022-07-06 PROBLEM — O34.219 PREVIOUS CESAREAN DELIVERY AFFECTING PREGNANCY: Status: ACTIVE | Noted: 2022-06-17

## 2022-07-06 PROBLEM — O41.00X0 OLIGOHYDRAMNIOS: Status: RESOLVED | Noted: 2022-04-26 | Resolved: 2022-07-06

## 2022-07-06 LAB
BASOPHILS # BLD AUTO: 0.04 X10(3)/MCL (ref 0–0.2)
BASOPHILS NFR BLD AUTO: 0.4 %
EOSINOPHIL # BLD AUTO: 0.18 X10(3)/MCL (ref 0–0.9)
EOSINOPHIL NFR BLD AUTO: 1.9 %
ERYTHROCYTE [DISTWIDTH] IN BLOOD BY AUTOMATED COUNT: 13.4 % (ref 11.5–17)
HCT VFR BLD AUTO: 32.9 % (ref 37–47)
HGB BLD-MCNC: 11.4 GM/DL (ref 12–16)
IMM GRANULOCYTES # BLD AUTO: 0.04 X10(3)/MCL (ref 0–0.04)
IMM GRANULOCYTES NFR BLD AUTO: 0.4 %
LYMPHOCYTES # BLD AUTO: 1.96 X10(3)/MCL (ref 0.6–4.6)
LYMPHOCYTES NFR BLD AUTO: 20.9 %
MCH RBC QN AUTO: 32.5 PG (ref 27–31)
MCHC RBC AUTO-ENTMCNC: 34.7 MG/DL (ref 33–36)
MCV RBC AUTO: 93.7 FL (ref 80–94)
MONOCYTES # BLD AUTO: 0.8 X10(3)/MCL (ref 0.1–1.3)
MONOCYTES NFR BLD AUTO: 8.5 %
NEUTROPHILS # BLD AUTO: 6.3 X10(3)/MCL (ref 2.1–9.2)
NEUTROPHILS NFR BLD AUTO: 67.9 %
NRBC BLD AUTO-RTO: 0 %
PLATELET # BLD AUTO: 158 X10(3)/MCL (ref 130–400)
PMV BLD AUTO: 10.5 FL (ref 7.4–10.4)
RBC # BLD AUTO: 3.51 X10(6)/MCL (ref 4.2–5.4)
WBC # SPEC AUTO: 9.4 X10(3)/MCL (ref 4.5–11.5)

## 2022-07-06 PROCEDURE — 63600175 PHARM REV CODE 636 W HCPCS: Performed by: STUDENT IN AN ORGANIZED HEALTH CARE EDUCATION/TRAINING PROGRAM

## 2022-07-06 PROCEDURE — 36415 COLL VENOUS BLD VENIPUNCTURE: CPT | Performed by: STUDENT IN AN ORGANIZED HEALTH CARE EDUCATION/TRAINING PROGRAM

## 2022-07-06 PROCEDURE — 25000003 PHARM REV CODE 250: Performed by: STUDENT IN AN ORGANIZED HEALTH CARE EDUCATION/TRAINING PROGRAM

## 2022-07-06 PROCEDURE — 11000001 HC ACUTE MED/SURG PRIVATE ROOM

## 2022-07-06 PROCEDURE — 85025 COMPLETE CBC W/AUTO DIFF WBC: CPT | Performed by: STUDENT IN AN ORGANIZED HEALTH CARE EDUCATION/TRAINING PROGRAM

## 2022-07-06 RX ORDER — IBUPROFEN 800 MG/1
800 TABLET ORAL EVERY 8 HOURS
Status: DISCONTINUED | OUTPATIENT
Start: 2022-07-06 | End: 2022-07-08 | Stop reason: HOSPADM

## 2022-07-06 RX ADMIN — SIMETHICONE 80 MG: 80 TABLET, CHEWABLE ORAL at 08:07

## 2022-07-06 RX ADMIN — SIMETHICONE 80 MG: 80 TABLET, CHEWABLE ORAL at 07:07

## 2022-07-06 RX ADMIN — DOCUSATE SODIUM 200 MG: 100 CAPSULE, LIQUID FILLED ORAL at 07:07

## 2022-07-06 RX ADMIN — OXYCODONE AND ACETAMINOPHEN 1 TABLET: 10; 325 TABLET ORAL at 08:07

## 2022-07-06 RX ADMIN — OXYCODONE AND ACETAMINOPHEN 1 TABLET: 10; 325 TABLET ORAL at 04:07

## 2022-07-06 RX ADMIN — KETOROLAC TROMETHAMINE 30 MG: 30 INJECTION, SOLUTION INTRAMUSCULAR at 04:07

## 2022-07-06 RX ADMIN — IBUPROFEN 800 MG: 800 TABLET, FILM COATED ORAL at 12:07

## 2022-07-06 RX ADMIN — PRENATAL VITAMINS-IRON FUMARATE 27 MG IRON-FOLIC ACID 0.8 MG TABLET 1 TABLET: at 08:07

## 2022-07-06 RX ADMIN — OXYCODONE AND ACETAMINOPHEN 1 TABLET: 10; 325 TABLET ORAL at 10:07

## 2022-07-06 RX ADMIN — IBUPROFEN 800 MG: 800 TABLET, FILM COATED ORAL at 07:07

## 2022-07-06 RX ADMIN — DOCUSATE SODIUM 200 MG: 100 CAPSULE, LIQUID FILLED ORAL at 08:07

## 2022-07-06 RX ADMIN — DIPHENHYDRAMINE HYDROCHLORIDE 25 MG: 25 CAPSULE ORAL at 01:07

## 2022-07-06 NOTE — PROGRESS NOTES
Postpartum Progress Note    28yo  POD#2 s/p RLTCS w/BTL at 39^4 WGA on 22. IUP history significant for GDMA2.  Nurse reports no acute events overnight. Ambulating, voiding, and tolerating regular diet. Reports that she passed flatus x1 yesterday, but has not since, and she feels bloated. Lochia is minimal. No subjective fever or chills. Pain moderately controlled with current PO pain regiment.  No HA, vision changes, dizziness, N/V, CP, SOB, breast pain or lower extremity pain.  She is both bottle and breastfeeding. Baby in room.     Vitals:    22 2215 22 0016 22 0409 22 0708   BP:  114/75  109/71   Pulse:  80  67   Resp: 18  18 18   Temp:  98.4 °F (36.9 °C)  98.1 °F (36.7 °C)   TempSrc:  Oral  Oral   SpO2:           Physical Exam:   Gen: AAO x 3, NAD, resting in bed  CV: RRR, S1, S2, no murmurs  Resp: Non labored, lungs CTA bilaterally, good air movement  Abd: fundus firm, at umbilicus, abdomen soft and mildly ttp, no rebound tenderness or guarding. Incision with edges well approximated, no evidence of dehiscence, erythema, or bleeding or discharge.  Ext: no edema, calves non tender and equal in size, DP/Radial 2+   Psych: cooperative, normal affect    Labs:  Lab Results   Component Value Date    HGB 11.4 (L) 2022    HCT 32.9 (L) 2022       Assessment/Plan  27 y.o. female   Status Post  Section PPD/POD#2.       1. Continue routine post-operative care, continue to monitor  2. Continue with Motrin, Tylenol, and percocet prn for pain control. Continue simethicone for gas pain. Consider use of milk of magnesia for constipation  3. Continue PNV  4. Postoperative H/H stable and appropriate  5. Anticipate discharge tomorrow on POD3    Gloria Dickinson MD  [facility] PGY2  2022 8:33 AM

## 2022-07-06 NOTE — PLAN OF CARE
Problem: Breastfeeding  Goal: Effective Breastfeeding  Outcome: Ongoing, Not Progressing  Intervention: Promote Breast Care and Comfort  Flowsheets (Taken 7/6/2022 1757)  Breast Care: Breastfeeding: lanolin to nipples  Breast Pumping: double electric breast pump utilized  Intervention: Promote Effective Breastfeeding  Flowsheets (Taken 7/6/2022 1757)  Breastfeeding Assistance:   feeding cue recognition promoted   feeding on demand promoted   infant stimulated to wakeful state   supplemental feeding provided  Intervention: Support Exclusive Breastfeeding Success  Flowsheets (Taken 7/6/2022 1230)  Supportive Measures: counseling provided  Breastfeeding Support:   lactation counseling provided   encouragement provided

## 2022-07-06 NOTE — PROGRESS NOTES
Postpartum Progress Note    26yo Lithuanian speaking  POD#1 s/p RLTCS w/BTL at 39^4 WGA on 22. IUP history significant for GDMA2.  Nurse reports no acute events overnight. Ambulating, voiding, and tolerating regular diet. Passing flatus. Lochia is appropriate. No subjective fever or chills. Pain well controlled with current pain regiment, will transition to orals today.  No HA, vision changes, dizziness, N/V, CP, SOB, breast pain or lower extremity pain.  She is both bottle and breastfeeding. Baby in room.      Physical Exam:   Vitals:    22 0100 22 0400 22 0714 22 0842   BP: 111/76 101/69 114/75    BP Location:       Patient Position:       Pulse: 70 72 80    Resp: 19 19 18 16   Temp: 98.4 °F (36.9 °C) 98.4 °F (36.9 °C) 99.1 °F (37.3 °C)    TempSrc: Oral Oral Oral    SpO2:           Gen: AAO x 3, NAD, resting in bed comfortably   CV: RRR, S1, S2, no murmurs  Resp: Non labored, lungs CTA bilaterally, good air movement  Abd: fundus firm below umbilicus, abdomen soft and NT, + BS. Dressing is clean, dry, intact.   Ext: no edema, calves non tender and equal in size, DP/Radial 2+   Psych: cooperative, normal affect    Labs:  Lab Results   Component Value Date    HGB 11.4 (L) 2022    HCT 32.9 (L) 2022         Assessment/Plan  27 y.o. female   Status Post  Section PPD/POD#1.       1. Continue routine post-operative care, continue to monitor  2. Continue PNV  3. H/H stable and appropriate  4. Pt has good urinary output and pulido has been removed. Transition to oral pain medication for pain control     Linda Sy MD  [facility]  Resident, HO-3

## 2022-07-06 NOTE — LACTATION NOTE
"Called to assist with positioning and latch; instruction provided via Language Line . Mother reports she will do both breast and bottle feeding. Assisted with both cross-cradle and football positions, infant remained sleepy at breast; no sustained latches noted. Mother requested to pump, double electric pump to bedside; assisted with 15" pump session, gtts of colostrum collected.  Encouraged to offer breast before bottle and to pump after each feeding until milk supply/ Bf established.   "

## 2022-07-06 NOTE — PLAN OF CARE
Problem: Adult Inpatient Plan of Care  Goal: Plan of Care Review  Outcome: Ongoing, Progressing  Goal: Patient-Specific Goal (Individualized)  Outcome: Ongoing, Progressing  Goal: Absence of Hospital-Acquired Illness or Injury  Outcome: Ongoing, Progressing  Goal: Optimal Comfort and Wellbeing  Outcome: Ongoing, Progressing  Goal: Readiness for Transition of Care  Outcome: Ongoing, Progressing     Problem: Diabetes in Pregnancy  Goal: Blood Glucose Level Within Targeted Range  Outcome: Ongoing, Progressing     Problem: Infection  Goal: Absence of Infection Signs and Symptoms  Outcome: Ongoing, Progressing     Problem:  Fall Injury Risk  Goal: Absence of Fall, Infant Drop and Related Injury  Outcome: Ongoing, Progressing     Problem: Adjustment to Role Transition (Postpartum  Delivery)  Goal: Successful Maternal Role Transition  Outcome: Ongoing, Progressing     Problem: Bleeding (Postpartum  Delivery)  Goal: Hemostasis  Outcome: Ongoing, Progressing     Problem: Infection (Postpartum  Delivery)  Goal: Absence of Infection Signs and Symptoms  Outcome: Ongoing, Progressing     Problem: Pain (Postpartum  Delivery)  Goal: Acceptable Pain Control  Outcome: Ongoing, Progressing     Problem: Postoperative Nausea and Vomiting (Postpartum  Delivery)  Goal: Nausea and Vomiting Relief  Outcome: Ongoing, Progressing     Problem: Postoperative Urinary Retention (Postpartum  Delivery)  Goal: Effective Urinary Elimination  Outcome: Ongoing, Progressing     Problem: Bleeding ( Delivery)  Goal: Bleeding is Controlled  Outcome: Ongoing, Progressing     Problem: Change in Fetal Wellbeing ( Delivery)  Goal: Stable Fetal Wellbeing  Outcome: Ongoing, Progressing     Problem: Infection ( Delivery)  Goal: Absence of Infection Signs and Symptoms  Outcome: Ongoing, Progressing     Problem: Respiratory Compromise ( Delivery)  Goal: Effective Oxygenation  and Ventilation  Outcome: Ongoing, Progressing     Problem: Skin Injury Risk Increased  Goal: Skin Health and Integrity  Outcome: Ongoing, Progressing

## 2022-07-07 PROCEDURE — 11000001 HC ACUTE MED/SURG PRIVATE ROOM

## 2022-07-07 PROCEDURE — 25000003 PHARM REV CODE 250: Performed by: STUDENT IN AN ORGANIZED HEALTH CARE EDUCATION/TRAINING PROGRAM

## 2022-07-07 RX ORDER — ACETAMINOPHEN 500 MG
1000 TABLET ORAL EVERY 8 HOURS
Status: ACTIVE | OUTPATIENT
Start: 2022-07-07 | End: 2022-07-08

## 2022-07-07 RX ADMIN — IBUPROFEN 800 MG: 800 TABLET, FILM COATED ORAL at 09:07

## 2022-07-07 RX ADMIN — DOCUSATE SODIUM 200 MG: 100 CAPSULE, LIQUID FILLED ORAL at 09:07

## 2022-07-07 RX ADMIN — OXYCODONE AND ACETAMINOPHEN 1 TABLET: 10; 325 TABLET ORAL at 12:07

## 2022-07-07 RX ADMIN — IBUPROFEN 800 MG: 800 TABLET, FILM COATED ORAL at 03:07

## 2022-07-07 RX ADMIN — IBUPROFEN 800 MG: 800 TABLET, FILM COATED ORAL at 05:07

## 2022-07-07 RX ADMIN — SIMETHICONE 80 MG: 80 TABLET, CHEWABLE ORAL at 12:07

## 2022-07-07 RX ADMIN — OXYCODONE AND ACETAMINOPHEN 1 TABLET: 10; 325 TABLET ORAL at 04:07

## 2022-07-07 RX ADMIN — SIMETHICONE 80 MG: 80 TABLET, CHEWABLE ORAL at 09:07

## 2022-07-07 NOTE — LACTATION NOTE
With aid of Language line , mother reports latching baby x1 since yesterday, pumped x1 reports limited because of increased pain; formula fed x6.  Maternal breasts are hobbs, compressible. Nipples intact.Discussed engorgement management, recommended increased pumping frequency; mother comfortable with use of pump, will pump after shower.

## 2022-07-07 NOTE — PLAN OF CARE
Problem: Adult Inpatient Plan of Care  Goal: Plan of Care Review  Outcome: Ongoing, Progressing  Goal: Patient-Specific Goal (Individualized)  Outcome: Ongoing, Progressing  Goal: Absence of Hospital-Acquired Illness or Injury  Outcome: Ongoing, Progressing  Goal: Optimal Comfort and Wellbeing  Outcome: Ongoing, Progressing  Goal: Readiness for Transition of Care  Outcome: Ongoing, Progressing     Problem: Diabetes in Pregnancy  Goal: Blood Glucose Level Within Targeted Range  Outcome: Ongoing, Progressing     Problem: Infection  Goal: Absence of Infection Signs and Symptoms  Outcome: Ongoing, Progressing     Problem:  Fall Injury Risk  Goal: Absence of Fall, Infant Drop and Related Injury  Outcome: Ongoing, Progressing     Problem: Adjustment to Role Transition (Postpartum  Delivery)  Goal: Successful Maternal Role Transition  Outcome: Ongoing, Progressing     Problem: Bleeding (Postpartum  Delivery)  Goal: Hemostasis  Outcome: Ongoing, Progressing     Problem: Infection (Postpartum  Delivery)  Goal: Absence of Infection Signs and Symptoms  Outcome: Ongoing, Progressing     Problem: Pain (Postpartum  Delivery)  Goal: Acceptable Pain Control  Outcome: Ongoing, Progressing     Problem: Postoperative Nausea and Vomiting (Postpartum  Delivery)  Goal: Nausea and Vomiting Relief  Outcome: Ongoing, Progressing     Problem: Postoperative Urinary Retention (Postpartum  Delivery)  Goal: Effective Urinary Elimination  Outcome: Ongoing, Progressing     Problem: Bleeding ( Delivery)  Goal: Bleeding is Controlled  Outcome: Ongoing, Progressing     Problem: Change in Fetal Wellbeing ( Delivery)  Goal: Stable Fetal Wellbeing  Outcome: Ongoing, Progressing     Problem: Infection ( Delivery)  Goal: Absence of Infection Signs and Symptoms  Outcome: Ongoing, Progressing     Problem: Respiratory Compromise ( Delivery)  Goal: Effective Oxygenation  and Ventilation  Outcome: Ongoing, Progressing     Problem: Skin Injury Risk Increased  Goal: Skin Health and Integrity  Outcome: Ongoing, Progressing     Problem: Breastfeeding  Goal: Effective Breastfeeding  Outcome: Ongoing, Progressing

## 2022-07-07 NOTE — PLAN OF CARE
Problem: Breastfeeding  Goal: Effective Breastfeeding  Outcome: Ongoing, Not Progressing  Intervention: Promote Breast Care and Comfort  Flowsheets (Taken 7/7/2022 1455)  Breast Care: Breastfeeding:   lanolin to nipples   warm shower encouraged  Breast Pumping:   double electric breast pump utilized   pre-pumping breast massage  Intervention: Support Exclusive Breastfeeding Success  Flowsheets (Taken 7/7/2022 1455)  Breastfeeding Support: lactation counseling provided

## 2022-07-07 NOTE — PROGRESS NOTES
Copied from baby's chart  ..Meconium  Admit Assessment    Patient Identification  Boy Sita Lovelace   :  2021  Admit Date:  2022  Attending Provider:  Savage Madrid MD              Referral:    received case management consult for meconium drug screen assessment due to late prenatal care.  I met with mom, Sita Lovelace, in her post-partum room. Mom presented appropriate and was cooperative. Baby boy, Jalyn Muro,  was born at 39.4 WGA weighing 8 lbs 8 oz.. Verified her face sheet information:      Living Situation:      Resides at [address] [place] [address] [address] [place] [place] [address] [place] [address], phone: [phone] (home).       Assessment narrative here:  ID: [id number] used. Mom reported she has a 6 y/o son. Mom reported a hx of employment and reported her plans to return after maternity leave. Mom reported to having all necessary supplies for baby; including a car seat and crib. I discussed safe sleep and car seat safety and provided literature in Norwegian and english. Mom reported her plans to breast feed and formula feed and reported to receiving WIC. I provided food stamp resources. SisterTrenton was at mom's bedside.    PEDI: Dr. Madrid  OB: Limited prenatal care: mom reported she recently relocated and received prenatal care in [place].       History/Current Symptoms of Anxiety/Depression: denied  Discussed PPD and identifying symptoms and provided mom with PPD counseling resources and symptom brochure (provided in english and Norwegian)     Identified Support:  Trenton Ricci     History/Current Substance Use: denied     Indications of Abuse/Neglect:  denied        Emotional/Behavioral/Cognitive Issues: none presented at this time.     Current RX Prescriptions: denied    Adequate Discharge Transportation: Yes    Mom's UDS:None collected     Baby's UDS:Negative    DCFS status: MDS pending. Will follow-up and file report is necessary.  Mom has been informed of policy.  Plan:     Patient/caregiver engaged in treatment planning process.      providing psychosocial and supportive counseling, resources, education, assistance and discharge planning as appropriate.  Patient/caregiver state understanding of  available resources,  following, remains available.        Patient/Caregiver informed of right to choose providers or agencies.  Patient/Caregiver provides permission to release any necessary information to [facility] and to Non-[facility] agencies as needed to facilitate patient care, treatment planning, and patient discharge planning.  Written and verbal resources provided.

## 2022-07-08 VITALS
HEART RATE: 78 BPM | TEMPERATURE: 98 F | RESPIRATION RATE: 18 BRPM | DIASTOLIC BLOOD PRESSURE: 75 MMHG | SYSTOLIC BLOOD PRESSURE: 115 MMHG | OXYGEN SATURATION: 100 %

## 2022-07-08 LAB
ESTROGEN SERPL-MCNC: NORMAL PG/ML
INSULIN SERPL-ACNC: NORMAL U[IU]/ML
LAB AP CLINICAL INFORMATION: NORMAL
LAB AP GROSS DESCRIPTION: NORMAL
LAB AP REPORT FOOTNOTES: NORMAL
T3RU NFR SERPL: NORMAL %

## 2022-07-08 PROCEDURE — 25000003 PHARM REV CODE 250: Performed by: STUDENT IN AN ORGANIZED HEALTH CARE EDUCATION/TRAINING PROGRAM

## 2022-07-08 RX ORDER — DOCUSATE SODIUM 100 MG/1
200 CAPSULE, LIQUID FILLED ORAL 2 TIMES DAILY
Qty: 30 CAPSULE | Refills: 0 | Status: SHIPPED | OUTPATIENT
Start: 2022-07-08 | End: 2022-07-16

## 2022-07-08 RX ORDER — IBUPROFEN 600 MG/1
600 TABLET ORAL EVERY 6 HOURS PRN
Qty: 28 TABLET | Refills: 0 | Status: SHIPPED | OUTPATIENT
Start: 2022-07-08 | End: 2022-07-15 | Stop reason: SDUPTHER

## 2022-07-08 RX ORDER — OXYCODONE AND ACETAMINOPHEN 5; 325 MG/1; MG/1
1 TABLET ORAL EVERY 4 HOURS PRN
Qty: 10 TABLET | Refills: 0 | Status: SHIPPED | OUTPATIENT
Start: 2022-07-08 | End: 2022-07-11

## 2022-07-08 RX ADMIN — OXYCODONE AND ACETAMINOPHEN 1 TABLET: 10; 325 TABLET ORAL at 12:07

## 2022-07-08 RX ADMIN — IBUPROFEN 800 MG: 800 TABLET, FILM COATED ORAL at 05:07

## 2022-07-08 NOTE — PLAN OF CARE
Problem: Adult Inpatient Plan of Care  Goal: Plan of Care Review  Outcome: Ongoing, Progressing  Goal: Patient-Specific Goal (Individualized)  Outcome: Ongoing, Progressing  Goal: Absence of Hospital-Acquired Illness or Injury  Outcome: Ongoing, Progressing  Goal: Optimal Comfort and Wellbeing  Outcome: Ongoing, Progressing  Goal: Readiness for Transition of Care  Outcome: Ongoing, Progressing     Problem: Diabetes in Pregnancy  Goal: Blood Glucose Level Within Targeted Range  Outcome: Ongoing, Progressing     Problem: Adjustment to Role Transition (Postpartum  Delivery)  Goal: Successful Maternal Role Transition  Outcome: Ongoing, Progressing     Problem: Bleeding (Postpartum  Delivery)  Goal: Hemostasis  Outcome: Ongoing, Progressing     Problem: Infection (Postpartum  Delivery)  Goal: Absence of Infection Signs and Symptoms  Outcome: Ongoing, Progressing     Problem: Pain (Postpartum  Delivery)  Goal: Acceptable Pain Control  Outcome: Ongoing, Progressing     Problem: Postoperative Nausea and Vomiting (Postpartum  Delivery)  Goal: Nausea and Vomiting Relief  Outcome: Ongoing, Progressing     Problem: Postoperative Urinary Retention (Postpartum  Delivery)  Goal: Effective Urinary Elimination  Outcome: Ongoing, Progressing     Problem: Skin Injury Risk Increased  Goal: Skin Health and Integrity  Outcome: Ongoing, Progressing     Problem: Breastfeeding  Goal: Effective Breastfeeding  Outcome: Ongoing, Progressing

## 2022-07-08 NOTE — LACTATION NOTE
"Mother continues with plan to do both breast and bottle.  Mother reports latching infant for 20" with aid of medium nipple shield, then bottle feed formula ad desiree amounts.  Demonstrated use of pump manually, encouraged mother to call WIC for appointment to obtain electric pump. Maternal breasts are hobbs but compressible. Nipples intact, using lanolin prn.  Discharge Bf education reviewed, written info given; OP resources discussed.  "

## 2022-07-08 NOTE — DISCHARGE SUMMARY
Delivery Discharge Summary  [facility]    Admission date: 2022  Discharge date: 2022    Admit Dx:   Discharge Dx:    Patient Active Problem List   Diagnosis    Type O blood, Rh negative    Gestational diabetes mellitus (GDM) in first trimester controlled on oral hypoglycemic drug    Previous  delivery affecting pregnancy    Breech presentation     delivery delivered       Procedure: RLTCS/BTL    Hospital Course:  Sita Lovelace is a 27 y.o. now  female who was admitted on 2022 for delivery. Patient delivered a viable  via RLTCS at 39w4d on 22. Apgars 9, 9. . Please see delivery note for further details. Pt was in stable condition post delivery and was transferred to the Mother-Baby Unit. Her postpartum course was complicated by diagnosis of gestational hypertension. On the date of discharge, patient's pain is controlled with oral pain medications. No fever or chills. She is ambulating without SOB or CP, and tolerating PO diet without N/V. Good urinary and bowel function. Reported lochia is within the normal range. Pt presently denies HA, changes in vision, RUQ pain, and pain in calves bilaterally. Pt is currently normotensive w/ last pressure reading at 115/75 8:30a 22. Patient has had no complaints or questions that were not addressed during the duration of her stay. Received rhogam for Rh negative status on 22. Pt in stable condition and ready for discharge on POD#3.     Vitals:    22 0021 22 0052 22 0805   BP: (!) 141/85 (!) 141/85  115/75   Pulse: 72 73  78   Resp: 18 18 18 18   Temp: 98.8 °F (37.1 °C) 99 °F (37.2 °C)  98.1 °F (36.7 °C)   TempSrc:       SpO2:           Physical exam:   Gen: AAO x 3, NAD  HEENT: NCAT, MMM, EOMI  CV: RRR, no murmurs; S1/S2  Resp: Clear to auscultation bilaterally with no wheezing, stridor, or upper airways sounds, good air movement, nontender chest  Abd: soft, nontender,  nondistended,   Incision: clean, dry no discharge  : uterus firm below umbilicus  Ext: bilateral trace edema +1, DP/Radial 2+. No asymmetry. No pain in calves bilaterally.      Pertinent studies:  Postpartum CBC  Lab Results   Component Value Date    WBC 9.4 07/06/2022    HGB 11.4 (L) 07/06/2022    HCT 32.9 (L) 07/06/2022    MCV 93.7 07/06/2022     07/06/2022     Blood loss: 600ml    Labs: No AM labs. Prenatal labs reviewed - Mother is Rh-negative s/p rhogam, rubella immune, varicella immune. H&H: 11.4/32.9    Disposition: To home, self care    Follow Up: [facility] with Dr. Sy  in 1 weeks for incision and blood pressure check. Call for appointment in 6 weeks for routine postpartum follow up.     Patient Instructions:     1.  Report to OB ED or call clinic for any bleeding >2 pads/hour for >2 hours, temperature >100.4, foul smelling discharge, pain uncontrolled with medications, or for any other concerns.  2. Pelvic rest,  no tub baths x 6 weeks, no heavy lifting >20lbs  3. Measure BP regularly, report to OB ED if systolic >155 and diastolic >110 or if you have intractable headaches, changes/loss of vision, RUQ pain, pain in calves bilaterally.  4. Rx for Percocet, Motrin and Colace provided. No driving while on narcotics.  5. Post partum contraception: to be discussed at follow up    Current Discharge Medication List      START taking these medications    Details   docusate sodium (COLACE) 100 MG capsule Take 2 capsules (200 mg total) by mouth 2 (two) times daily. for 8 days  Qty: 30 capsule, Refills: 0      ibuprofen (ADVIL,MOTRIN) 600 MG tablet Take 1 tablet (600 mg total) by mouth every 6 (six) hours as needed for Pain.  Qty: 28 tablet, Refills: 0      oxyCODONE-acetaminophen (PERCOCET) 5-325 mg per tablet Take 1 tablet by mouth every 4 (four) hours as needed for Pain.  Qty: 10 tablet, Refills: 0    Comments: Quantity prescribed more than 7 day supply? No         CONTINUE these medications which have  NOT CHANGED    Details   PNV,calcium 72-iron-folic acid (WESTAB PLUS) 27 mg iron- 1 mg Tab Take 1 tablet (1 each total) by mouth once daily.  Qty: 30 tablet, Refills: 2         STOP taking these medications       metFORMIN (GLUCOPHAGE-XR) 500 MG ER 24hr tablet Comments:   Reason for Stopping:               Time spent on discharge: 30min    Arline Frost MD  [facility] HO-1  07/08/2022 7:24 AM

## 2022-07-11 ENCOUNTER — PATIENT OUTREACH (OUTPATIENT)
Dept: ADMINISTRATIVE | Facility: CLINIC | Age: 27
End: 2022-07-11
Payer: MEDICAID

## 2022-07-11 NOTE — PROGRESS NOTES
C3 nurse spoke with Sita Lovelace for a TCC post hospital discharge follow up call. Used [employer] and spoke to Angelica #[id number]. The patient has a scheduled HOSFU appointment with [facility] on 7/15/2022 @ 3:00 and on 8/19/2022 @ 1040.

## 2022-07-15 ENCOUNTER — OFFICE VISIT (OUTPATIENT)
Dept: FAMILY MEDICINE | Facility: CLINIC | Age: 27
End: 2022-07-15
Payer: MEDICAID

## 2022-07-15 VITALS
DIASTOLIC BLOOD PRESSURE: 63 MMHG | BODY MASS INDEX: 38.76 KG/M2 | HEART RATE: 61 BPM | SYSTOLIC BLOOD PRESSURE: 98 MMHG | WEIGHT: 227 LBS | RESPIRATION RATE: 17 BRPM | TEMPERATURE: 99 F | HEIGHT: 64 IN | OXYGEN SATURATION: 100 %

## 2022-07-15 DIAGNOSIS — Z78.9 PRESENCE OF SURGICAL INCISION: ICD-10-CM

## 2022-07-15 PROCEDURE — 99213 OFFICE O/P EST LOW 20 MIN: CPT | Mod: PBBFAC

## 2022-07-15 RX ORDER — IBUPROFEN 600 MG/1
600 TABLET ORAL EVERY 6 HOURS PRN
Qty: 28 TABLET | Refills: 0 | Status: SHIPPED | OUTPATIENT
Start: 2022-07-15 | End: 2022-07-22

## 2022-07-15 NOTE — PROGRESS NOTES
"[facility] OFFICE VISIT NOTE  MRN: [id number]  Date: 07/15/2022    Chief Complaint: Wound Check (1 week post partum Incision check)    Subjective:    HIRA Lovelace is a 27 y.o. female  presenting to [facility] for an incision evaluation following RLTCS. Pt is a  who delivered at 39.4WGA on 22. She is currently 10 days post-partum. She is doing well. Bonding with baby.     Acute complaints: none  IUP complicated by GDMA2    Review of Systems  Constitutional: no fever, no chills  CV: no chest pain, no palpitations  Resp: no shortness of breath, no cough, no wheezing  GI: no nausea, no vomiting, no constipation, no diarrhea  : no dysuria, no increased frequency, no fowl odor to urine  Neuro: No headache, no dizziness, no lightheadedness  Skin: no rash    Current Medications:   Current Outpatient Medications   Medication Sig Dispense Refill    docusate sodium (COLACE) 100 MG capsule Take 2 capsules (200 mg total) by mouth 2 (two) times daily. for 8 days 30 capsule 0    ibuprofen (ADVIL,MOTRIN) 600 MG tablet Take 1 tablet (600 mg total) by mouth every 6 (six) hours as needed for Pain. 28 tablet 0    PNV,calcium 72-iron-folic acid (WESTAB PLUS) 27 mg iron- 1 mg Tab Take 1 tablet (1 each total) by mouth once daily. 30 tablet 2     No current facility-administered medications for this visit.       Objective:  Blood pressure 98/63, pulse 61, temperature 98.5 °F (36.9 °C), resp. rate 17, height 5' 4.02" (1.626 m), weight 103 kg (227 lb), SpO2 100 %, unknown if currently breastfeeding.   Physical Exam  General: in no acute distress  Respiratory: clear to auscultation bilaterally. No wheezes, rhonchi, or rales.  Cardiovascular: regular rate and rhythm. S1/S2 present. No murmurs, gallops or rubs appreciated  Skin: Well healing pfenneilstein incision. No tenderness to palpation. No drainage or open areas noted     Assessment:   1.  delivery delivered      Plan:  -Pt doing well  -Incision is healing well, no " signs of dehiscence or infection  -Ibuprofen refilled x1, take with food   -Pelvic rest x6 weeks. Activity precautions given   -Pt does not desire postpartum contraception   -Consider ordering a 2hr glucose test at 6 week appointment given her history of GDMA2 during this pregnancy.    Return to clinic in 5 weeks for routine postpartum visit. May schedule earlier appointment if needed.     Linda Sy MD  [employer]  Resident, HO-3

## 2022-07-19 NOTE — PROGRESS NOTES
The chart was reviewed. I agree with the assessment and plan. Care provided was reasonable and necessary.

## 2022-07-26 ENCOUNTER — PATIENT MESSAGE (OUTPATIENT)
Dept: FAMILY MEDICINE | Facility: CLINIC | Age: 27
End: 2022-07-26
Payer: MEDICAID

## 2022-08-19 ENCOUNTER — OFFICE VISIT (OUTPATIENT)
Dept: FAMILY MEDICINE | Facility: CLINIC | Age: 27
End: 2022-08-19
Payer: MEDICAID

## 2022-08-19 VITALS
OXYGEN SATURATION: 100 % | HEIGHT: 64 IN | WEIGHT: 202.81 LBS | BODY MASS INDEX: 34.62 KG/M2 | TEMPERATURE: 98 F | SYSTOLIC BLOOD PRESSURE: 124 MMHG | DIASTOLIC BLOOD PRESSURE: 80 MMHG | HEART RATE: 60 BPM

## 2022-08-19 DIAGNOSIS — Z98.51: ICD-10-CM

## 2022-08-19 DIAGNOSIS — Z98.891 HISTORY OF LOW TRANSVERSE CESAREAN SECTION: Primary | ICD-10-CM

## 2022-08-19 PROCEDURE — 99213 OFFICE O/P EST LOW 20 MIN: CPT | Mod: PBBFAC

## 2022-08-19 NOTE — PROGRESS NOTES
Chief Complaint  Follow-up (6 wk pp)      History of Present Illness  Sita Lovelace is a 27 y.o. year old female presents to the clinic for post partum visit. Pt is now a  female, who delivered via LTCS on 22. Uncomplicated pregnancy and delivery. Baby is doing well at home, formula feeding. Pt has not had her menstrual cycle return yet. No vaginal bleeding or discharge. Pt is not sexually active. She reports good mood and appetite, feels supported with her baby. Pt also had a BTL when she had the .   Of note, pt does complain of mild periumbilical discomfort. Denies any nausea, vomiting or diarrhea. Stools are regular in color, consistency and frequency. No fevers or chills.     Review of Systems  ROS-as noted in HPI    Physical Exam  Physical Exam  Vitals reviewed.   Constitutional:       General: She is not in acute distress.  Cardiovascular:      Rate and Rhythm: Normal rate and regular rhythm.   Pulmonary:      Effort: Pulmonary effort is normal.      Breath sounds: Normal breath sounds.   Abdominal:      General: Abdomen is flat. Bowel sounds are normal. There is no distension.      Palpations: Abdomen is soft. There is no mass.      Tenderness: There is no abdominal tenderness. There is no guarding.      Comments: Transverse suprapubic incision is dry, intact, non-tender and without any evidence of infection.  Umbilicus appears intact    Psychiatric:         Mood and Affect: Affect normal. Mood is not depressed.         Behavior: Behavior is not withdrawn.         Vitals:    22 1125   BP: 124/80   Pulse: 60   Temp: 98.1 °F (36.7 °C)     Wt Readings from Last 2 Encounters:   22 92 kg (202 lb 13.2 oz)   07/15/22 103 kg (227 lb)         Current Outpatient Medications  Outpatient Medications as of 2022   Medication Sig Dispense Refill    PNV,calcium 72-iron-folic acid (WESTAB PLUS) 27 mg iron- 1 mg Tab Take 1 tablet (1 each total) by mouth once daily. 30 tablet 2     No  current facility-administered medications on file as of 2022.             Assessment / Plan:    History of low transverse  section  History of bilateral ligation of fallopian tubes  -pt is doing well post-partum, incision healing well  -no signs and sx of postpartum depression  -advised patient to return to clinic if her abd discomfort does not subside in the next month or so for further evaluation           Follow up:    In 6 months, or earlier if needed.     Isis Yang M.D.  [employer]  PGY-2

## 2022-09-28 ENCOUNTER — PATIENT OUTREACH (OUTPATIENT)
Dept: ADMINISTRATIVE | Facility: HOSPITAL | Age: 27
End: 2022-09-28
Payer: MEDICAID

## 2024-12-27 NOTE — PROGRESS NOTES
ERP at bedside   I have discussed the case with the resident and reviewed the resident's history and physical, assessment, plan, and progress note. I agree with the findings.       Bill Hess MD  [facility]

## (undated) DEVICE — SUT VICRYL 1 OB 36 CTX

## (undated) DEVICE — SOL WATER STRL IRR 1000ML

## (undated) DEVICE — SEE MEDLINE ITEM 156931

## (undated) DEVICE — PAD UNDERPAD 30X30

## (undated) DEVICE — SUT GUT 2-0 54

## (undated) DEVICE — SOL NACL IRR 1000ML BTL

## (undated) DEVICE — TAPE CURAD PAPER ADH 2INX10YD

## (undated) DEVICE — PAD SANITARY OB STERILE

## (undated) DEVICE — SUT PLAIN MONO 2-0 XLH 27IN

## (undated) DEVICE — SUT 1 36IN CHROMIC GUT CTX

## (undated) DEVICE — BULB SYRINGE EAR IRRIGATION

## (undated) DEVICE — Device

## (undated) DEVICE — SUT CHROMIC 2-0 CT1 36IN BR

## (undated) DEVICE — ELECTRODE REM PLYHSV RETURN 9

## (undated) DEVICE — GLOVE PROTEXIS BLUE LATEX 6.5

## (undated) DEVICE — SEE MEDLINE ITEM 157117

## (undated) DEVICE — CAP BABY BEANIE

## (undated) DEVICE — GLOVE PROTEXIS HYDROGEL SZ7.5

## (undated) DEVICE — SUT MONOCRYL 3-0 KS UND MON